# Patient Record
Sex: MALE | Race: OTHER | NOT HISPANIC OR LATINO
[De-identification: names, ages, dates, MRNs, and addresses within clinical notes are randomized per-mention and may not be internally consistent; named-entity substitution may affect disease eponyms.]

---

## 2017-06-26 ENCOUNTER — APPOINTMENT (OUTPATIENT)
Dept: ORTHOPEDIC SURGERY | Facility: CLINIC | Age: 67
End: 2017-06-26

## 2017-06-26 VITALS — HEIGHT: 71 IN | BODY MASS INDEX: 32.2 KG/M2 | WEIGHT: 230 LBS

## 2017-06-26 DIAGNOSIS — Z87.19 PERSONAL HISTORY OF OTHER DISEASES OF THE DIGESTIVE SYSTEM: ICD-10-CM

## 2017-06-26 DIAGNOSIS — G47.30 SLEEP APNEA, UNSPECIFIED: ICD-10-CM

## 2017-06-26 DIAGNOSIS — I10 ESSENTIAL (PRIMARY) HYPERTENSION: ICD-10-CM

## 2017-06-26 RX ORDER — ASPIRIN 325 MG/1
TABLET, FILM COATED ORAL
Refills: 0 | Status: ACTIVE | COMMUNITY

## 2017-06-26 RX ORDER — LISINOPRIL 30 MG/1
TABLET ORAL
Refills: 0 | Status: ACTIVE | COMMUNITY

## 2017-08-07 ENCOUNTER — APPOINTMENT (OUTPATIENT)
Dept: ORTHOPEDIC SURGERY | Facility: CLINIC | Age: 67
End: 2017-08-07
Payer: COMMERCIAL

## 2017-08-07 DIAGNOSIS — M67.911 UNSPECIFIED DISORDER OF SYNOVIUM AND TENDON, RIGHT SHOULDER: ICD-10-CM

## 2017-08-07 PROCEDURE — 99213 OFFICE O/P EST LOW 20 MIN: CPT

## 2019-12-05 ENCOUNTER — RECORD ABSTRACTING (OUTPATIENT)
Age: 69
End: 2019-12-05

## 2019-12-05 DIAGNOSIS — Z87.438 PERSONAL HISTORY OF OTHER DISEASES OF MALE GENITAL ORGANS: ICD-10-CM

## 2019-12-05 LAB
PSA FREE FLD-MCNC: 20
PSA FREE SERPL-MCNC: 0.9
PSA SERPL-MCNC: 4.7

## 2020-01-28 ENCOUNTER — APPOINTMENT (OUTPATIENT)
Dept: UROLOGY | Facility: CLINIC | Age: 70
End: 2020-01-28
Payer: MEDICARE

## 2020-01-28 VITALS
HEART RATE: 57 BPM | TEMPERATURE: 97.8 F | HEIGHT: 71 IN | SYSTOLIC BLOOD PRESSURE: 145 MMHG | WEIGHT: 230 LBS | DIASTOLIC BLOOD PRESSURE: 86 MMHG | BODY MASS INDEX: 32.2 KG/M2

## 2020-01-28 DIAGNOSIS — N52.01 ERECTILE DYSFUNCTION DUE TO ARTERIAL INSUFFICIENCY: ICD-10-CM

## 2020-01-28 DIAGNOSIS — N52.8 OTHER MALE ERECTILE DYSFUNCTION: ICD-10-CM

## 2020-01-28 DIAGNOSIS — Z00.00 ENCOUNTER FOR GENERAL ADULT MEDICAL EXAMINATION W/OUT ABNORMAL FINDINGS: ICD-10-CM

## 2020-01-28 DIAGNOSIS — R31.21 ASYMPTOMATIC MICROSCOPIC HEMATURIA: ICD-10-CM

## 2020-01-28 DIAGNOSIS — Z85.79 PERSONAL HISTORY OF OTHER MALIGNANT NEOPLASMS OF LYMPHOID, HEMATOPOIETIC AND RELATED TISSUES: ICD-10-CM

## 2020-01-28 DIAGNOSIS — R31.29 OTHER MICROSCOPIC HEMATURIA: ICD-10-CM

## 2020-01-28 DIAGNOSIS — R39.15 URGENCY OF URINATION: ICD-10-CM

## 2020-01-28 DIAGNOSIS — R68.89 OTHER GENERAL SYMPTOMS AND SIGNS: ICD-10-CM

## 2020-01-28 LAB
BILIRUB UR QL STRIP: NORMAL
CLARITY UR: CLEAR
COLLECTION METHOD: NORMAL
GLUCOSE UR-MCNC: NORMAL
HCG UR QL: 0.2 EU/DL
HGB UR QL STRIP.AUTO: NORMAL
KETONES UR-MCNC: NORMAL
LEUKOCYTE ESTERASE UR QL STRIP: NORMAL
NITRITE UR QL STRIP: NORMAL
PH UR STRIP: 5.5
PROT UR STRIP-MCNC: NORMAL
SP GR UR STRIP: 1.02

## 2020-01-28 PROCEDURE — 99204 OFFICE O/P NEW MOD 45 MIN: CPT

## 2020-01-28 PROCEDURE — 76857 US EXAM PELVIC LIMITED: CPT

## 2020-01-28 PROCEDURE — 81003 URINALYSIS AUTO W/O SCOPE: CPT | Mod: QW

## 2020-01-28 RX ORDER — ATORVASTATIN CALCIUM 10 MG/1
10 TABLET, FILM COATED ORAL
Refills: 0 | Status: ACTIVE | COMMUNITY

## 2020-01-28 NOTE — ASSESSMENT
[FreeTextEntry1] : We sent blood for PSA and 4K score and ll base next steps on these results. If the PSA or 4K is significantly higher, then repeat MRI and PNB. Otherwise reassess in 6 months.\par \par As for the ED, I discussed the use of PDE%i oral medication and we ordered sildenafil 100mg to try at  mg / dose. We reviewed the risks, alternatives, indications and chances for success.\par \par I also sent urine for C+S and cytology due to the microscopic hematuria.\par \par Misty Taylor MD\par

## 2020-01-28 NOTE — PHYSICAL EXAM
[General Appearance - Well Nourished] : well nourished [General Appearance - Well Developed] : well developed [Normal Appearance] : normal appearance [Well Groomed] : well groomed [General Appearance - In No Acute Distress] : no acute distress [Heart Rate And Rhythm] : Heart rate and rhythm were normal [] : no respiratory distress [Abdomen Soft] : soft [Costovertebral Angle Tenderness] : no ~M costovertebral angle tenderness [Abdomen Tenderness] : non-tender [Normal Station and Gait] : the gait and station were normal for the patient's age [Skin Color & Pigmentation] : normal skin color and pigmentation [No Focal Deficits] : no focal deficits [Oriented To Time, Place, And Person] : oriented to person, place, and time [Affect] : the affect was normal [Inguinal Lymph Nodes Enlarged Bilaterally] : inguinal [Not Anxious] : not anxious [Mood] : the mood was normal [Urethral Meatus] : meatus normal [Penis Abnormality] : normal circumcised penis [Urinary Bladder Findings] : the bladder was normal on palpation [Scrotum] : the scrotum was normal [Epididymis] : the epididymides were normal [Testes Mass (___cm)] : there were no testicular masses [Testes Tenderness] : no tenderness of the testes [Prostate Tenderness] : the prostate was not tender [No Prostate Nodules] : no prostate nodules [Prostate Size ___ gm] : prostate size [unfilled] gm [Prostate Size ___ (0-4)] : prostate size [unfilled] (scale: 0-4)

## 2020-01-28 NOTE — LETTER BODY
[Dear  ___] : Dear  [unfilled], [Consult Letter:] : I had the pleasure of evaluating your patient, [unfilled]. [Please see my note below.] : Please see my note below. [Consult Closing:] : Thank you very much for allowing me to participate in the care of this patient.  If you have any questions, please do not hesitate to contact me. [FreeTextEntry3] : Best Regards, \par \par Misty Taylor MD\par

## 2020-01-31 LAB
4K SCORE CALCULATION: 2 %
BACTERIA UR CULT: NORMAL
FREE PSA: 1.98 NG/ML
PERCENT FREE PSA: 23 %
PSA FREE FLD-MCNC: 22 %
PSA FREE SERPL-MCNC: 1.92 NG/ML
PSA SERPL-MCNC: 8.89 NG/ML
TOTAL PSA: 8.64 NG/ML
URINE CYTOLOGY: NORMAL

## 2020-10-06 ENCOUNTER — APPOINTMENT (OUTPATIENT)
Dept: UROLOGY | Facility: CLINIC | Age: 70
End: 2020-10-06

## 2020-10-06 ENCOUNTER — APPOINTMENT (OUTPATIENT)
Dept: UROLOGY | Facility: CLINIC | Age: 70
End: 2020-10-06
Payer: MEDICARE

## 2020-10-06 VITALS — DIASTOLIC BLOOD PRESSURE: 80 MMHG | HEART RATE: 61 BPM | TEMPERATURE: 98.2 F | SYSTOLIC BLOOD PRESSURE: 157 MMHG

## 2020-10-06 PROCEDURE — 99214 OFFICE O/P EST MOD 30 MIN: CPT

## 2020-10-06 NOTE — PHYSICAL EXAM
[General Appearance - Well Developed] : well developed [General Appearance - Well Nourished] : well nourished [Normal Appearance] : normal appearance [Well Groomed] : well groomed [General Appearance - In No Acute Distress] : no acute distress [Edema] : no peripheral edema [Respiration, Rhythm And Depth] : normal respiratory rhythm and effort [Exaggerated Use Of Accessory Muscles For Inspiration] : no accessory muscle use [Abdomen Soft] : soft [Abdomen Tenderness] : non-tender [Costovertebral Angle Tenderness] : no ~M costovertebral angle tenderness [Urethral Meatus] : meatus normal [Penis Abnormality] : normal circumcised penis [Urinary Bladder Findings] : the bladder was normal on palpation [Scrotum] : the scrotum was normal [Rectal Exam - Seminal Vesicles] : the seminal vesicles were normal [Epididymis] : the epididymides were normal [Testes Tenderness] : no tenderness of the testes [Testes Mass (___cm)] : there were no testicular masses [No Prostate Nodules] : no prostate nodules [Prostate Size ___ gm] : prostate size [unfilled] gm [Normal Station and Gait] : the gait and station were normal for the patient's age [] : no rash [No Focal Deficits] : no focal deficits [Oriented To Time, Place, And Person] : oriented to person, place, and time [Affect] : the affect was normal [Mood] : the mood was normal [Not Anxious] : not anxious [No Palpable Adenopathy] : no palpable adenopathy

## 2020-10-07 NOTE — ASSESSMENT
[FreeTextEntry1] : BPH with urinary obstruction\par -PVR today was 238 cc\par -most recent PSA in June 2020 was 7.79\par -reviewed 4K score with patient and MRI from 2018.  Discussed elevated PSA likely related to prostate volume.\par -favorable PSA density of 0.07\par -discussed initiation of Tamsulosin for elevated PVR and urinary symptoms.  Goals and side effect of medication reviewed.\par -check UCx today \par \par RTC in 1 month

## 2020-10-07 NOTE — HISTORY OF PRESENT ILLNESS
[Urinary Retention] : urinary retention [Urinary Urgency] : urinary urgency [Urinary Frequency] : urinary frequency [Nocturia] : nocturia [Straining] : straining [Weak Stream] : weak stream [FreeTextEntry1] : This is a 70 year old gentleman with PMHx significant for HTN and high cholesterol.  Has been followed in the past by Dr. Kelly for elevated PSA.\par \par Prostate MRI in 2018, PIRADS 3.  patient underwent biopsy 11/2018 which was negative.\par Prostate volume of 112 cc noted on MRI.\par PSA range from 7-9 over the past years\par 4K score in Jan 2020 was 2%\par \par Currently reports weakening stream over the past year, occasionally needs to push to urinate, slight sense of incomplete bladder emptying.\par Nocturia x 2\par \par No family history of any urological cancers\par  [Urinary Incontinence] : no urinary incontinence [Intermittency] : no intermittency [Post-Void Dribbling] : no post-void dribbling [Dysuria] : no dysuria [Hematuria - Gross] : no gross hematuria

## 2020-10-08 LAB — BACTERIA UR CULT: NORMAL

## 2020-11-03 ENCOUNTER — APPOINTMENT (OUTPATIENT)
Dept: UROLOGY | Facility: CLINIC | Age: 70
End: 2020-11-03
Payer: MEDICARE

## 2020-11-03 VITALS — TEMPERATURE: 98.3 F | DIASTOLIC BLOOD PRESSURE: 74 MMHG | HEART RATE: 66 BPM | SYSTOLIC BLOOD PRESSURE: 133 MMHG

## 2020-11-03 DIAGNOSIS — N40.1 BENIGN PROSTATIC HYPERPLASIA WITH LOWER URINARY TRACT SYMPMS: ICD-10-CM

## 2020-11-03 DIAGNOSIS — N13.8 BENIGN PROSTATIC HYPERPLASIA WITH LOWER URINARY TRACT SYMPMS: ICD-10-CM

## 2020-11-03 PROCEDURE — 99213 OFFICE O/P EST LOW 20 MIN: CPT

## 2020-11-03 NOTE — ASSESSMENT
[FreeTextEntry1] : BPH\par on tamsulosin 0.4\par he will consider increasing to tamsuolisn 0.8 - he will call if he chooses to increase\par f/u 6months\par hx elevated PSA\par will monitor\par

## 2020-11-03 NOTE — HISTORY OF PRESENT ILLNESS
[FreeTextEntry1] : now on tamsulosin 0.4\par improvement in nocutira\par feels better with medication

## 2021-05-11 ENCOUNTER — APPOINTMENT (OUTPATIENT)
Dept: UROLOGY | Facility: CLINIC | Age: 71
End: 2021-05-11
Payer: MEDICARE

## 2021-05-11 VITALS
TEMPERATURE: 97.9 F | SYSTOLIC BLOOD PRESSURE: 134 MMHG | WEIGHT: 230 LBS | HEIGHT: 71 IN | BODY MASS INDEX: 32.2 KG/M2 | HEART RATE: 68 BPM | DIASTOLIC BLOOD PRESSURE: 78 MMHG

## 2021-05-11 PROCEDURE — 99213 OFFICE O/P EST LOW 20 MIN: CPT

## 2021-05-11 RX ORDER — SILDENAFIL 100 MG/1
100 TABLET, FILM COATED ORAL
Qty: 30 | Refills: 1 | Status: ACTIVE | COMMUNITY
Start: 2020-01-28 | End: 1900-01-01

## 2021-05-11 NOTE — HISTORY OF PRESENT ILLNESS
[FreeTextEntry1] : this is a 70 year old man with BPH \par On  tamsulosin 0.8 mg \par Patient reports improved symptoms\par feels better with medication \par \par PSA  8.89  Jan 2020\par

## 2021-05-11 NOTE — ASSESSMENT
[FreeTextEntry1] : BPH with elevated PSA\par on tamsulosin 0.8 mg \par Reinforce teaching on medication use and s/e\par will monitor\par \par Repeat PSA - results over the phone \par Prostate MRI in 2018, PIRADS 3. patient underwent biopsy 11/2018 which was negative.\par Prostate volume of 112 cc noted on MRI.\par PSA range from 7-9 over the past years\par 4K score in Jan 2020 was 2%\par \par follow in 6 months

## 2021-05-12 LAB
PSA FREE FLD-MCNC: 26 %
PSA FREE SERPL-MCNC: 2 NG/ML
PSA SERPL-MCNC: 7.62 NG/ML

## 2021-06-30 ENCOUNTER — NON-APPOINTMENT (OUTPATIENT)
Age: 71
End: 2021-06-30

## 2021-11-11 ENCOUNTER — LABORATORY RESULT (OUTPATIENT)
Age: 71
End: 2021-11-11

## 2021-11-11 ENCOUNTER — APPOINTMENT (OUTPATIENT)
Dept: UROLOGY | Facility: CLINIC | Age: 71
End: 2021-11-11
Payer: MEDICARE

## 2021-11-11 VITALS — TEMPERATURE: 98.1 F | SYSTOLIC BLOOD PRESSURE: 149 MMHG | HEART RATE: 72 BPM | DIASTOLIC BLOOD PRESSURE: 79 MMHG

## 2021-11-11 PROCEDURE — 99214 OFFICE O/P EST MOD 30 MIN: CPT

## 2021-11-11 NOTE — PHYSICAL EXAM

## 2021-11-11 NOTE — ASSESSMENT
[FreeTextEntry1] : BPH with elevated PSA\par Continue  tamsulosin 0.8 mg \par Reinforce teaching on medication use and s/e\par No interest  in procedural at this time. \par Advise to reduce caffeine and fluid intake 2 hrs before bedtime\par Comfortable with mild symptoms. \par \par Repeat PSA - results over the phone \par Prostate MRI in 2018, PIRADS 3. biopsy 11/2018, negative.\par Prostate volume of 112 cc noted on MRI.\par PSA 7.62, free PSA 26% May 11 2021\par PSA range from 7-9 over the past years\par 4K score in Jan 2020 was 2%\par \par labs: \par UA, UC\par PSA\par \par \par follow in 6 months. \par

## 2021-11-11 NOTE — HISTORY OF PRESENT ILLNESS
[Currently Experiencing ___] :  [unfilled] [Urinary Frequency] : urinary frequency [Nocturia] : nocturia [Weak Stream] : weak stream [Erectile Dysfunction] : Erectile Dysfunction [None] : None [FreeTextEntry1] : 71M with hx HTN, HDL, ED, and BPH\par returns for follow u\par PSA 7.62, free PSA 26% May 11 2021\par Prostate MRI in 2018, PIRADS 3t biopsy 11/2018 which was negative.\par Prostate volume of 112 cc noted on MRI.\par On Tamsulosin 0.4 mg - did not take 0.8 \par mildly improve symptoms\par improve FOS\par no bothersome symptoms\par Denies dysuria, hematuria, flank pain \par

## 2021-11-12 ENCOUNTER — NON-APPOINTMENT (OUTPATIENT)
Age: 71
End: 2021-11-12

## 2022-01-25 ENCOUNTER — APPOINTMENT (OUTPATIENT)
Dept: ORTHOPEDIC SURGERY | Facility: AMBULATORY SURGERY CENTER | Age: 72
End: 2022-01-25
Payer: MEDICARE

## 2022-01-25 VITALS — HEIGHT: 71 IN | WEIGHT: 230 LBS | BODY MASS INDEX: 32.2 KG/M2

## 2022-01-25 DIAGNOSIS — M25.511 PAIN IN RIGHT SHOULDER: ICD-10-CM

## 2022-01-25 DIAGNOSIS — R29.898 OTHER SYMPTOMS AND SIGNS INVOLVING THE MUSCULOSKELETAL SYSTEM: ICD-10-CM

## 2022-01-25 DIAGNOSIS — M75.21 BICIPITAL TENDINITIS, RIGHT SHOULDER: ICD-10-CM

## 2022-01-25 PROCEDURE — 99203 OFFICE O/P NEW LOW 30 MIN: CPT

## 2022-01-25 PROCEDURE — 73564 X-RAY EXAM KNEE 4 OR MORE: CPT | Mod: RT

## 2022-01-25 RX ORDER — FAMOTIDINE 40 MG/1
40 TABLET, FILM COATED ORAL
Refills: 0 | Status: COMPLETED | COMMUNITY
End: 2022-01-25

## 2022-01-25 RX ORDER — FAMOTIDINE 40 MG/1
40 TABLET, FILM COATED ORAL
Refills: 0 | Status: ACTIVE | COMMUNITY

## 2022-01-25 NOTE — HISTORY OF PRESENT ILLNESS
[de-identified] : 71-year-old gentleman who had seen 4 years ago for his shoulder which has been fine, comes in because he fell injuring his right knee 1 week ago. He is having pain, swelling and stiffness.\par He has pain standing but usually less pain as he is walking.  Pain is mostly in the anteromedial knee and into the shin area.  Without any assistive devices.  He did take some Tylenol and put ice on it.\par No left knee pain.\par His knee does not hurt normally except if he sitting a long time like when he is at the theater and the knee is flexed he might get some aching or discomfort in the knee and feels better to straighten it out when he sitting.\par His knee hurts more with stairs as well now.

## 2022-01-25 NOTE — ASSESSMENT
[FreeTextEntry1] : 71-year-old on his right knee 1 week ago with a contusion.  There is some bruising.  There could be bone bruising as well.  No evidence of fracture.  Given that he is walking without any significant pain it would be very unlikely there is any tibial plateau fracture but again could be some bone bruising.  If he starts to have increased pain at all walking I would want him to get an MRI and he was given a prescription.\par He can move the knee as is comfortable.  He should limit weightbearing activities for the next couple weeks until its feeling better.  Elevate and he can alternate warm soaks and ice for the bruising and swelling.  Tylenol if needed but not to mask pain so he can walk more.\par There is mild patellofemoral arthritis which is why he probably gets pain when sitting long periods of time.  He should move his knee a little more.  I will have him do more strengthening as his knee gets better for this and we will discuss more treatment for arthritis which is mild at this point fortunately.\par Follow-up in 2 to 3 weeks to check and make sure it is getting better

## 2022-01-25 NOTE — PHYSICAL EXAM
[LE] : Sensory: Intact in bilateral lower extremities [Normal RLE] : Right Lower Extremity: No scars, rashes, lesions, ulcers, skin intact [Normal LLE] : Left Lower Extremity: No scars, rashes, lesions, ulcers, skin intact [Normal Touch] : sensation intact for touch [Obese] : obese [Normal] : Oriented to person, place, and time, insight and judgement were intact and the affect was normal [de-identified] : Right knee with left for comparison\par Skin is intact.\par There is mild edema and small amount of fluid in the prepatellar bursa and faint ecchymoses anterior knee extending along the upper shin.\par No erythema.\par Possible trace effusion.\par Minimally tender on the dorsal patella.  Mildly tender patella facets.  Tender greatest anterior medial tibial plateau.  Nontender lateral joint line.  Nontender on the femur.\par Normal varus and valgus laxity.\par Knee range of motion is 0-1 125 to 130 degrees flexion with pain on increasing flexion anterior knee.  No crepitus\par Negative anterior posterior drawer and Ia Lachman.\par Motor and sensation are intact distally.\par Gait is not antalgic and he denies having any pain when walking. [de-identified] : \par X-rays of the right knee weightbearing 4 views taken today showed sclerosis medial tibial plateau.  No visible fractures or depression of the tibial plateau.\par Small osteophytes at the patella consistent with mild patellofemoral arthritis.  Medial and lateral joint spaces are well-maintained.

## 2022-01-26 ENCOUNTER — APPOINTMENT (OUTPATIENT)
Dept: ORTHOPEDIC SURGERY | Facility: CLINIC | Age: 72
End: 2022-01-26

## 2022-02-09 ENCOUNTER — APPOINTMENT (OUTPATIENT)
Dept: ORTHOPEDIC SURGERY | Facility: CLINIC | Age: 72
End: 2022-02-09
Payer: MEDICARE

## 2022-02-09 DIAGNOSIS — M17.11 UNILATERAL PRIMARY OSTEOARTHRITIS, RIGHT KNEE: ICD-10-CM

## 2022-02-09 DIAGNOSIS — S80.01XA CONTUSION OF RIGHT KNEE, INITIAL ENCOUNTER: ICD-10-CM

## 2022-02-09 PROCEDURE — 99213 OFFICE O/P EST LOW 20 MIN: CPT

## 2022-02-09 NOTE — HISTORY OF PRESENT ILLNESS
[1] : a current pain level of 1/10 [de-identified] : 70 y/o male being seen for a f/u visit for his right knee after falling 4 weeks ago. He is feeling better in both his knee and tibia. He still has some swelling over his tibia but currently no pain with walking and stairs. He is point tender over patella and has pain with kneeling on that knee. He did not go for MRI bc he was seeing improvement. He has not been icing or taking any pain medications.

## 2022-02-09 NOTE — ASSESSMENT
[FreeTextEntry1] : 70 y/o with right knee anterior contusion doing mostly better after about 3 weeks.  He likely had some bone bruising.  He has been walking a lot and going up and down 4 flights of stairs without pain which is good.  He should give it a few more weeks to let it fully heal.  He will do home exercises and is in physical therapy doing strengthening.  Ice as needed.  Follow-up if he has any ongoing pain or concerns.

## 2022-02-09 NOTE — PHYSICAL EXAM
[Normal RLE] : Right Lower Extremity: No scars, rashes, lesions, ulcers, skin intact [Normal LLE] : Left Lower Extremity: No scars, rashes, lesions, ulcers, skin intact [Normal Touch] : sensation intact for touch [LE] : Sensory: Intact in bilateral lower extremities [Normal] : Gait: normal [Obese] : obese [de-identified] : Right knee with left for comparison\par Skin is intact.\par No edema, ecchymoses, erythema.\par No effusion.\par Normal gait\par Focal tenderness on the dorsal inferior half patella.  Mildly tender patella facets.  Minimally tender medial tibia nontender lateral joint line.  Femoral condyles and tibia\par Normal varus and valgus laxity.\par Knee range of motion is 0-130 degrees flexion without pain.  No crepitus\par Negative anterior posterior drawer and Ia Lachman.\par Motor and sensation are intact distally.\par Ia Lachman.  Normal varus and valgus laxity.  Negative anterior and posterior drawer.  Negative Marcus\par Gait is not antalgic and he denies having any pain when walking. [de-identified] : \par X-rays of the right knee weightbearing 3 views taken today compared to last visit showed no visible fractures or depression of the tibial plateau.\par Small osteophytes at the patella consistent with mild patellofemoral arthritis.  Medial and lateral joint spaces are well-maintained.

## 2022-05-10 ENCOUNTER — APPOINTMENT (OUTPATIENT)
Dept: UROLOGY | Facility: CLINIC | Age: 72
End: 2022-05-10
Payer: MEDICARE

## 2022-05-10 VITALS — DIASTOLIC BLOOD PRESSURE: 73 MMHG | HEART RATE: 77 BPM | SYSTOLIC BLOOD PRESSURE: 134 MMHG | TEMPERATURE: 97.6 F

## 2022-05-10 PROCEDURE — 99214 OFFICE O/P EST MOD 30 MIN: CPT

## 2022-05-10 NOTE — ASSESSMENT
[FreeTextEntry1] : BPH\par on flomax 0.8\par add finasteride\par considering outlet procedure\par \par elevated PSA\par for 4k \par f/u 6 months

## 2022-05-12 LAB
APPEARANCE: CLEAR
BACTERIA UR CULT: NORMAL
BACTERIA: NEGATIVE
BILIRUBIN URINE: NEGATIVE
BLOOD URINE: NEGATIVE
COLOR: YELLOW
GLUCOSE QUALITATIVE U: NEGATIVE
HYALINE CASTS: 0 /LPF
KETONES URINE: NEGATIVE
LEUKOCYTE ESTERASE URINE: NEGATIVE
MICROSCOPIC-UA: NORMAL
NITRITE URINE: NEGATIVE
PH URINE: 6
PROTEIN URINE: NORMAL
RED BLOOD CELLS URINE: 4 /HPF
SPECIFIC GRAVITY URINE: 1.02
SQUAMOUS EPITHELIAL CELLS: 0 /HPF
UROBILINOGEN URINE: NORMAL
WHITE BLOOD CELLS URINE: 1 /HPF

## 2022-05-18 LAB
4K SCORE CALCULATION: 3 %
FREE PSA: 1.95 NG/ML
PERCENT FREE PSA: 26 %
TOTAL PSA: 7.55 NG/ML

## 2022-06-20 ENCOUNTER — NON-APPOINTMENT (OUTPATIENT)
Age: 72
End: 2022-06-20

## 2022-11-08 ENCOUNTER — APPOINTMENT (OUTPATIENT)
Dept: UROLOGY | Facility: CLINIC | Age: 72
End: 2022-11-08

## 2022-11-08 VITALS
TEMPERATURE: 98.1 F | BODY MASS INDEX: 32.2 KG/M2 | SYSTOLIC BLOOD PRESSURE: 140 MMHG | HEIGHT: 71 IN | DIASTOLIC BLOOD PRESSURE: 75 MMHG | WEIGHT: 230 LBS

## 2022-11-08 PROCEDURE — 99214 OFFICE O/P EST MOD 30 MIN: CPT

## 2022-11-09 LAB
APPEARANCE: CLEAR
BACTERIA: NEGATIVE
BILIRUBIN URINE: NEGATIVE
BLOOD URINE: NEGATIVE
COLOR: YELLOW
GLUCOSE QUALITATIVE U: NEGATIVE
HYALINE CASTS: 2 /LPF
KETONES URINE: NEGATIVE
LEUKOCYTE ESTERASE URINE: NEGATIVE
MICROSCOPIC-UA: NORMAL
NITRITE URINE: NEGATIVE
PH URINE: 5.5
PROTEIN URINE: NORMAL
PSA FREE FLD-MCNC: 33 %
PSA FREE SERPL-MCNC: 0.82 NG/ML
PSA SERPL-MCNC: 2.47 NG/ML
RED BLOOD CELLS URINE: 2 /HPF
SPECIFIC GRAVITY URINE: 1.03
SQUAMOUS EPITHELIAL CELLS: 1 /HPF
UROBILINOGEN URINE: NORMAL
WHITE BLOOD CELLS URINE: 1 /HPF

## 2022-11-09 NOTE — HISTORY OF PRESENT ILLNESS
[Currently Experiencing ___] :  [unfilled] [Urinary Urgency] : urinary urgency [Urinary Frequency] : urinary frequency [Nocturia] : nocturia [None] : None [FreeTextEntry1] : 82 yr old male with h/o HTN, DANIELA, elevated PSA, BPH with nocturia\par Returns for follow up \par Complains of mildly improved nocturia\par 4K score 3%, PSA 7.55, free PSA 26% - on Finasteride \par Prostate MRI in 2018, PIRADS 3. biopsy 11/2018, negative.\par Prostate volume of 112 cc noted on MRI.\par Currently Tamsulosin 0.8 mg and Finasteride 5 mg\par Denies dysuria, hematuria , flank pain

## 2022-11-09 NOTE — ASSESSMENT
[FreeTextEntry1] : BPH with nocturia\par Reviewed treatment options\par no interest in starting medication for now\par Continue Tamsulosin 0.8 mg and finasteride 5 mg - Refilled \par \par Elevated PSA \par 4K score 3%, PSA 7.55, free PSA 26% - on Finasteride -MAY 2022\par Labs: UA, UC, PSA \par \par follow up in 6 months

## 2022-11-11 ENCOUNTER — NON-APPOINTMENT (OUTPATIENT)
Age: 72
End: 2022-11-11

## 2022-11-11 LAB — BACTERIA UR CULT: NORMAL

## 2022-11-14 ENCOUNTER — NON-APPOINTMENT (OUTPATIENT)
Age: 72
End: 2022-11-14

## 2023-05-08 ENCOUNTER — NON-APPOINTMENT (OUTPATIENT)
Age: 73
End: 2023-05-08

## 2023-05-09 ENCOUNTER — APPOINTMENT (OUTPATIENT)
Dept: UROLOGY | Facility: CLINIC | Age: 73
End: 2023-05-09
Payer: MEDICARE

## 2023-05-09 VITALS — HEART RATE: 60 BPM | SYSTOLIC BLOOD PRESSURE: 138 MMHG | DIASTOLIC BLOOD PRESSURE: 77 MMHG | TEMPERATURE: 97.8 F

## 2023-05-09 PROCEDURE — 99214 OFFICE O/P EST MOD 30 MIN: CPT

## 2023-05-09 RX ORDER — FINASTERIDE 5 MG/1
5 TABLET, FILM COATED ORAL DAILY
Qty: 90 | Refills: 3 | Status: ACTIVE | COMMUNITY
Start: 2022-05-10 | End: 1900-01-01

## 2023-05-09 NOTE — ASSESSMENT
[FreeTextEntry1] : BPH\par Continue tamsulosin 0.8 + finasteride 5 mg\par UA UCx\par no interest in new/additonal meds\par \par Elevated PSA\par flucatuating\par prev neg biopsy\par PSA today\par \par f/u results by phone. \par f/u 1year

## 2023-05-09 NOTE — HISTORY OF PRESENT ILLNESS
[FreeTextEntry1] : 72 yr old male with h/o HTN, DANIELA, elevated PSA, BPH with nocturia\par Returns for follow up \par \par On flomax 0.8 + finasteride 5 mg\par Good FOS\par Improved nocturia\par Improved urgency and frequency.\par Denies dysuria, denies hematuria. No flank pain\par \par Elevated PSA\par Last PSA 11/2022 2.47\par Previous PSA 05/2022 PSA 7.55, free PSA 26% - on Finasteride \par Previous PSA 11/2021 7.16\par Prostate MRI in 2018, PIRADS 3. biopsy 11/2018, negative.\par Prostate volume of 112 cc noted on MRI.\par \par No other complaint.

## 2023-05-10 LAB
APPEARANCE: ABNORMAL
BACTERIA: NEGATIVE /HPF
BILIRUBIN URINE: NEGATIVE
BLOOD URINE: NEGATIVE
CAST: 0 /LPF
COLOR: YELLOW
EPITHELIAL CELLS: 1 /HPF
GLUCOSE QUALITATIVE U: NEGATIVE MG/DL
KETONES URINE: ABNORMAL MG/DL
LEUKOCYTE ESTERASE URINE: NEGATIVE
MICROSCOPIC-UA: NORMAL
NITRITE URINE: NEGATIVE
PH URINE: 5.5
PROTEIN URINE: NEGATIVE MG/DL
PSA SERPL-MCNC: 2.57 NG/ML
RED BLOOD CELLS URINE: 2 /HPF
SPECIFIC GRAVITY URINE: 1.02
UROBILINOGEN URINE: 0.2 MG/DL
WHITE BLOOD CELLS URINE: 0 /HPF

## 2023-05-11 LAB — BACTERIA UR CULT: NORMAL

## 2024-06-04 ENCOUNTER — APPOINTMENT (OUTPATIENT)
Dept: UROLOGY | Facility: CLINIC | Age: 74
End: 2024-06-04
Payer: MEDICARE

## 2024-06-04 VITALS
TEMPERATURE: 98.4 F | HEIGHT: 71 IN | HEART RATE: 72 BPM | SYSTOLIC BLOOD PRESSURE: 150 MMHG | BODY MASS INDEX: 32.2 KG/M2 | WEIGHT: 230 LBS | DIASTOLIC BLOOD PRESSURE: 73 MMHG

## 2024-06-04 DIAGNOSIS — N40.0 BENIGN PROSTATIC HYPERPLASIA WITHOUT LOWER URINARY TRACT SYMPMS: ICD-10-CM

## 2024-06-04 DIAGNOSIS — R97.20 BENIGN PROSTATIC HYPERPLASIA WITHOUT LOWER URINARY TRACT SYMPMS: ICD-10-CM

## 2024-06-04 PROCEDURE — 99214 OFFICE O/P EST MOD 30 MIN: CPT

## 2024-06-04 PROCEDURE — G2211 COMPLEX E/M VISIT ADD ON: CPT

## 2024-06-05 ENCOUNTER — NON-APPOINTMENT (OUTPATIENT)
Age: 74
End: 2024-06-05

## 2024-06-05 LAB
APPEARANCE: CLEAR
BACTERIA: NEGATIVE /HPF
BILIRUBIN URINE: NEGATIVE
BLOOD URINE: ABNORMAL
CAST: 2 /LPF
COLOR: YELLOW
EPITHELIAL CELLS: 3 /HPF
GLUCOSE QUALITATIVE U: NEGATIVE MG/DL
KETONES URINE: NEGATIVE MG/DL
LEUKOCYTE ESTERASE URINE: NEGATIVE
MICROSCOPIC-UA: NORMAL
NITRITE URINE: NEGATIVE
PH URINE: 5.5
PROTEIN URINE: NORMAL MG/DL
RED BLOOD CELLS URINE: 42 /HPF
SPECIFIC GRAVITY URINE: 1.03
UROBILINOGEN URINE: 0.2 MG/DL
WHITE BLOOD CELLS URINE: 1 /HPF

## 2024-06-05 NOTE — HISTORY OF PRESENT ILLNESS
[FreeTextEntry1] : 73M follow-up BPH/LUTS on Flomax 0.8 mg qHS previously on Finasteride, but stopped ~5 months reports symptoms at baseline 2-3x nocturia no dysuria/hematuria reports erections are good  Labs/Imaging: PSA 2.2 - 10/19/23 (on finasteride) 2.57 - 5/10/23 (on finasteride) 2.47 - 11/9/22 (on finasteride) 05/2022 PSA 7.55, free PSA 26% - on Finasteride 11/2021 PSA 7.16 Prostate MRI in 2018, PIRADS 3. biopsy 11/2018, negative Prostate volume of 112 cc noted on MRI

## 2024-06-05 NOTE — ASSESSMENT
[FreeTextEntry1] : A/P: 73M w/ BPH/LUTS - c/w Flomax - urinary symptoms at baseline - behavioral modifications - UA/UCx - PSA today - FU x1 year

## 2024-06-06 DIAGNOSIS — R97.20 ELEVATED PROSTATE, SPECIFIC ANTIGEN [PSA]: ICD-10-CM

## 2024-06-06 LAB
BACTERIA UR CULT: NORMAL
PSA FREE FLD-MCNC: 27 %
PSA FREE SERPL-MCNC: 1.73 NG/ML
PSA SERPL-MCNC: 6.51 NG/ML

## 2024-06-06 RX ORDER — TAMSULOSIN HYDROCHLORIDE 0.4 MG/1
0.4 CAPSULE ORAL
Qty: 180 | Refills: 3 | Status: ACTIVE | COMMUNITY
Start: 2020-10-06 | End: 1900-01-01

## 2024-06-20 ENCOUNTER — APPOINTMENT (OUTPATIENT)
Dept: UROLOGY | Facility: CLINIC | Age: 74
End: 2024-06-20
Payer: MEDICARE

## 2024-06-20 ENCOUNTER — NON-APPOINTMENT (OUTPATIENT)
Age: 74
End: 2024-06-20

## 2024-06-20 VITALS
TEMPERATURE: 98.3 F | DIASTOLIC BLOOD PRESSURE: 75 MMHG | BODY MASS INDEX: 32.2 KG/M2 | WEIGHT: 230 LBS | SYSTOLIC BLOOD PRESSURE: 134 MMHG | HEIGHT: 71 IN | HEART RATE: 60 BPM

## 2024-06-20 DIAGNOSIS — D49.4 NEOPLASM OF UNSPECIFIED BEHAVIOR OF BLADDER: ICD-10-CM

## 2024-06-20 PROCEDURE — 52000 CYSTOURETHROSCOPY: CPT

## 2024-06-20 PROCEDURE — 99214 OFFICE O/P EST MOD 30 MIN: CPT | Mod: 25,57

## 2024-06-20 NOTE — HISTORY OF PRESENT ILLNESS
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

## 2024-06-20 NOTE — ASSESSMENT
[FreeTextEntry1] : bladder tumor for ct urogram We reviewed the cystoscopy results. The patient understands that the finding on cystoscopy are abnormal and may represent a bladder cancer. Diagnosis and treatment of this consist of transurethral resection of the bladder tumor as the first step. The patient understands that this is often an outpatient procedure. Risks of TURBT including urinary tract infection, gross hematuria, the potential need for a brumfield catheter for a prolonged period postop, urinary retention, deep vein thrombosis/pulmonary embolism and risk of bladder perforation rarely requiring open exploration and repair were discussed. The potential for repeat resection in the intermediate postop period was reviewed. The natural history of bladder cancer including risks of recurrence, role of intravesical therapy and potential need for extensive treatment were reviewed. The patient opts to proceed.

## 2024-06-21 ENCOUNTER — APPOINTMENT (OUTPATIENT)
Dept: CT IMAGING | Facility: HOSPITAL | Age: 74
End: 2024-06-21

## 2024-06-21 ENCOUNTER — OUTPATIENT (OUTPATIENT)
Dept: OUTPATIENT SERVICES | Facility: HOSPITAL | Age: 74
LOS: 1 days | End: 2024-06-21
Payer: MEDICARE

## 2024-06-21 ENCOUNTER — RESULT REVIEW (OUTPATIENT)
Age: 74
End: 2024-06-21

## 2024-06-21 LAB — POCT ISTAT CREATININE: 1 MG/DL — SIGNIFICANT CHANGE UP (ref 0.5–1.3)

## 2024-06-21 PROCEDURE — 71046 X-RAY EXAM CHEST 2 VIEWS: CPT | Mod: 26

## 2024-06-21 PROCEDURE — 74177 CT ABD & PELVIS W/CONTRAST: CPT | Mod: 26,MH

## 2024-06-21 PROCEDURE — 74177 CT ABD & PELVIS W/CONTRAST: CPT | Mod: MH

## 2024-06-21 PROCEDURE — 71046 X-RAY EXAM CHEST 2 VIEWS: CPT

## 2024-06-21 PROCEDURE — 82565 ASSAY OF CREATININE: CPT

## 2024-06-24 LAB
APPEARANCE: CLEAR
BACTERIA UR CULT: NORMAL
BACTERIA: NEGATIVE /HPF
BILIRUBIN URINE: NEGATIVE
BLOOD URINE: ABNORMAL
CAST: 0 /LPF
COLOR: YELLOW
EPITHELIAL CELLS: 1 /HPF
GLUCOSE QUALITATIVE U: NEGATIVE MG/DL
KETONES URINE: NEGATIVE MG/DL
LEUKOCYTE ESTERASE URINE: NEGATIVE
MICROSCOPIC-UA: NORMAL
NITRITE URINE: NEGATIVE
PH URINE: 6
PROTEIN URINE: NEGATIVE MG/DL
RED BLOOD CELLS URINE: 4 /HPF
SPECIFIC GRAVITY URINE: 1.02
UROBILINOGEN URINE: 1 MG/DL
WHITE BLOOD CELLS URINE: 1 /HPF

## 2024-07-08 DIAGNOSIS — N39.0 URINARY TRACT INFECTION, SITE NOT SPECIFIED: ICD-10-CM

## 2024-07-08 RX ORDER — AMOXICILLIN AND CLAVULANATE POTASSIUM 875; 125 MG/1; MG/1
875-125 TABLET, COATED ORAL
Qty: 14 | Refills: 0 | Status: ACTIVE | COMMUNITY
Start: 2024-07-08 | End: 1900-01-01

## 2024-07-12 NOTE — ASU PATIENT PROFILE, ADULT - NSICDXPASTMEDICALHX_GEN_ALL_CORE_FT
PAST MEDICAL HISTORY:  CAD (coronary artery disease)     Fatty liver     HLD (hyperlipidemia)     HTN (hypertension)     DANIELA on CPAP

## 2024-07-12 NOTE — ASU PATIENT PROFILE, ADULT - NSICDXPASTSURGICALHX_GEN_ALL_CORE_FT
PAST SURGICAL HISTORY:  History of ankle surgery right    History of melanoma excision right thigh    S/P cholecystectomy

## 2024-07-14 ENCOUNTER — TRANSCRIPTION ENCOUNTER (OUTPATIENT)
Age: 74
End: 2024-07-14

## 2024-07-15 ENCOUNTER — APPOINTMENT (OUTPATIENT)
Dept: UROLOGY | Facility: AMBULATORY SURGERY CENTER | Age: 74
End: 2024-07-15

## 2024-07-15 ENCOUNTER — RESULT REVIEW (OUTPATIENT)
Age: 74
End: 2024-07-15

## 2024-07-15 ENCOUNTER — OUTPATIENT (OUTPATIENT)
Dept: OUTPATIENT SERVICES | Facility: HOSPITAL | Age: 74
LOS: 1 days | Discharge: HOME | End: 2024-07-15
Payer: MEDICARE

## 2024-07-15 ENCOUNTER — TRANSCRIPTION ENCOUNTER (OUTPATIENT)
Age: 74
End: 2024-07-15

## 2024-07-15 VITALS
WEIGHT: 245.59 LBS | HEIGHT: 71 IN | RESPIRATION RATE: 16 BRPM | SYSTOLIC BLOOD PRESSURE: 123 MMHG | OXYGEN SATURATION: 96 % | DIASTOLIC BLOOD PRESSURE: 72 MMHG | TEMPERATURE: 99 F | HEART RATE: 56 BPM

## 2024-07-15 VITALS
SYSTOLIC BLOOD PRESSURE: 125 MMHG | HEART RATE: 56 BPM | TEMPERATURE: 97 F | DIASTOLIC BLOOD PRESSURE: 73 MMHG | OXYGEN SATURATION: 98 % | RESPIRATION RATE: 12 BRPM

## 2024-07-15 DIAGNOSIS — Z98.890 OTHER SPECIFIED POSTPROCEDURAL STATES: Chronic | ICD-10-CM

## 2024-07-15 DIAGNOSIS — Z90.49 ACQUIRED ABSENCE OF OTHER SPECIFIED PARTS OF DIGESTIVE TRACT: Chronic | ICD-10-CM

## 2024-07-15 PROCEDURE — 88305 TISSUE EXAM BY PATHOLOGIST: CPT | Mod: 26

## 2024-07-15 PROCEDURE — 52235 CYSTOSCOPY AND TREATMENT: CPT

## 2024-07-15 RX ORDER — FENTANYL CITRATE 50 UG/ML
25 INJECTION, SOLUTION INTRAMUSCULAR; INTRAVENOUS
Refills: 0 | Status: DISCONTINUED | OUTPATIENT
Start: 2024-07-15 | End: 2024-07-15

## 2024-07-15 RX ORDER — ASPIRIN 325 MG/1
1 TABLET, FILM COATED ORAL
Refills: 0 | DISCHARGE

## 2024-07-15 RX ORDER — LISINOPRIL AND HYDROCHLOROTHIAZIDE 25; 20 MG/1; MG/1
1 TABLET ORAL
Refills: 0 | DISCHARGE

## 2024-07-15 RX ORDER — FAMOTIDINE 40 MG
1 TABLET ORAL
Refills: 0 | DISCHARGE

## 2024-07-15 RX ORDER — TAMSULOSIN HYDROCHLORIDE 0.4 MG/1
2 CAPSULE ORAL
Refills: 0 | DISCHARGE

## 2024-07-15 RX ORDER — APREPITANT 125MG-80MG
40 KIT ORAL ONCE
Refills: 0 | Status: COMPLETED | OUTPATIENT
Start: 2024-07-15 | End: 2024-07-15

## 2024-07-15 RX ORDER — AMLODIPINE BESYLATE 2.5 MG/1
1 TABLET ORAL
Refills: 0 | DISCHARGE

## 2024-07-15 RX ORDER — ACETAMINOPHEN 325 MG
1000 TABLET ORAL ONCE
Refills: 0 | Status: COMPLETED | OUTPATIENT
Start: 2024-07-15 | End: 2024-07-15

## 2024-07-15 RX ORDER — ICOSAPENT ETHYL 0.5 G/1
2 CAPSULE, LIQUID FILLED ORAL
Refills: 0 | DISCHARGE

## 2024-07-15 RX ORDER — ATORVASTATIN CALCIUM 20 MG/1
1 TABLET, FILM COATED ORAL
Refills: 0 | DISCHARGE

## 2024-07-15 RX ORDER — DEXTROSE MONOHYDRATE AND SODIUM CHLORIDE 5; .3 G/100ML; G/100ML
500 INJECTION, SOLUTION INTRAVENOUS
Refills: 0 | Status: DISCONTINUED | OUTPATIENT
Start: 2024-07-15 | End: 2024-07-15

## 2024-07-15 RX ORDER — PHENAZOPYRIDINE HCL 200 MG/1
200 TABLET ORAL ONCE
Refills: 0 | Status: DISCONTINUED | OUTPATIENT
Start: 2024-07-15 | End: 2024-07-15

## 2024-07-15 RX ORDER — METFORMIN HYDROCHLORIDE 850 MG/1
1 TABLET, FILM COATED ORAL
Refills: 0 | DISCHARGE

## 2024-07-15 RX ADMIN — DEXTROSE MONOHYDRATE AND SODIUM CHLORIDE 100 MILLILITER(S): 5; .3 INJECTION, SOLUTION INTRAVENOUS at 09:06

## 2024-07-15 RX ADMIN — Medication 1000 MILLIGRAM(S): at 06:20

## 2024-07-15 RX ADMIN — APREPITANT 40 MILLIGRAM(S): KIT at 06:19

## 2024-07-15 NOTE — ASU DISCHARGE PLAN (ADULT/PEDIATRIC) - CARE PROVIDER_API CALL
Skylar Campbell.  Urology  [address]  [place] [address]  Phone: [phone]  Fax: [phone]  Follow Up Time:

## 2024-07-15 NOTE — ASU DISCHARGE PLAN (ADULT/PEDIATRIC) - ASU DC SPECIAL INSTRUCTIONSFT
TRANSURETHRAL RESECTION OF BLADDER TUMOR    GENERAL: It is common to have blood in your urine after your procedure.  It may be pink or even red; and it is important to increase fluid intake to 2-3L of water per day to keep the urine as clear as possible. Please inform your doctor if you have a significant amount of clot in the urine or if you are unable to void at all.  The urine may clear and then become bloody again especially as you are more physically active. It is not uncommon to have some burning when you urinate, this will gradually improve. If a catheter is in place, it is not uncommon to have occasional leakage or urine or blood around the catheter. Please call your urologist if this is excessive and/or the urine is not draining through the catheter into the bag.    CATHETER: Some patients are sent home with a Brumfield catheter, while others go home urinating on their own. A Brumfield catheter continuously drains the urine from the bladder. If you still have a catheter, the nurses will review instructions and care before you go home.     PAIN: You may take Tylenol (acetaminophen) 650-975mg and/or Motrin (ibuprofen) 400-600mg, both available over the counter, for pain every 6 hours as needed. Do not exceed 4000mg of Tylenol (acetaminophen) daily. You may alternate these medications such that you take one or the other every 3 hours for around the clock pain coverage.    STOOL SOFTENERS: Do not allow yourself to become constipated as straining may cause bleeding. Take stool softeners or a laxative (ex. Miralax, Colace, Senokot, ExLax, etc), available over the counter, if needed.    ANTICOAGULATION: If you are taking any blood thinning medications, please discuss with your urologist prior to restarting these medications unless otherwise specified.    BATHING: You may shower or bathe. If going home with brumfield, shower only until catheter is removed.    DIET: You may resume your regular diet and regular medication regimen.    ACTIVITY: No heavy lifting or strenuous exercise until you are evaluated at your post-operative appointment. Otherwise, you may return to your usual level of physical activity.    FOLLOW-UP: If you did not already schedule your post-operative appointment, please call your urologist to schedule and follow-up appointment.    CALL YOUR UROLOGIST IF: You have any bleeding that does not stop, inability to void >8 hours, fever over 100.4 F, chills, persistent nausea/vomiting, changes in your incision concerning for infection, or if your pain is not controlled on your discharge pain medications.

## 2024-07-15 NOTE — ASU PREOP CHECKLIST - ALLERGIES REVIEWED
Patient: Cedric Cedeño Date: 2024   : 1947 Attending: Samuel Jose MD   77 year old male Allergies:   ALLERGIES:   Allergen Reactions    Dairy Digestive DIARRHEA    Lactose Intolerance   (Food Or Med) DIARRHEA        Post-op Day: 2    Operation Procedure: Transurethral Resection of the Prostate (TURP)     Chief Complaint: BPH     Subjective/HPI: The patient is seen resting in bed.  His CBI was clamped at 0700.  Urine remains clear yellow via valderrama catheter.  He is planning to go on a walk now.  Discussed with patient and RN plans to pull valderrama after the walk if his urine remains clear to watermelon pink in color.    He denies chest pain, SOB, N/V, or abdominal pain.  He says he noticed some blood clots in the valderrama yesterday after he tried to have a BM.    VSS, afebrile  H/H 13.3/38.0  WBC 6.8  Cr 0.97    Vital Last Value 24 Hour Range   Temperature 97.9 °F (36.6 °C) (24) Temp  Min: 97.9 °F (36.6 °C)  Max: 99.3 °F (37.4 °C)   Pulse (!) 60 (24) Pulse  Min: 60  Max: 73   Respiratory 17 (24) Resp  Min: 16  Max: 18   Non-Invasive  Blood Pressure 111/65 (24) BP  Min: 110/68  Max: 125/67   Arterial   Blood Pressure   No data recorded   Pulse Oximetry 96 % (24) SpO2  Min: 94 %  Max: 98 %     Vital Today Admitted   Weight 85.3 kg (188 lb) (24) Weight: 85.3 kg (188 lb) (24)   Height N/A Height: 6' (182.9 cm) (24)   BMI N/A BMI (Calculated): 25.5 (24)     Intake/Output:    I/O this shift:  In: 6000 [Other:6000]  Out: 4000 [Urine:4000]    I/O last 3 completed shifts:  In: 1080 [P.O.:1080]  Out: 39523 [Urine:01083]      Intake/Output Summary (Last 24 hours) at 2024 0749  Last data filed at 2024 0702  Gross per 24 hour   Intake 7080 ml   Output 17197 ml   Net -06996 ml       Medications/Infusions:  Scheduled:   Current Facility-Administered Medications   Medication Dose Route Frequency Provider  Last Rate Last Admin    famotidine (PEPCID) tablet 20 mg  20 mg Oral BID Samuel Jose MD   20 mg at 05/04/24 0703    polyethylene glycol (MIRALAX) packet 17 g  17 g Oral Daily Eliezer Wadsworth MD   17 g at 05/03/24 2034    senna (SENOKOT) 8.6 mg  1 tablet Oral Nightly Eliezer Wadsworth MD   8.6 mg at 05/03/24 2034    losartan (COZAAR) tablet 25 mg  25 mg Oral Daily Samuel Jose MD   25 mg at 05/03/24 0841       Continuous Infusions:   Current Facility-Administered Medications   Medication Dose Route Frequency Provider Last Rate Last Admin       Physical Exam:       Head:  Normocephalic, without obvious abnormality, atraumatic.   Back:   Symmetric, no curvature, ROM normal, no CVA tenderness   Lungs:   Respirations unlabored on RA.    Abdomen:   Soft, non-tender, not distended.    Extremities: Extremities normal, atraumatic, no cyanosis or edema   Urine: Clear yellow with CBI clamped     Laboratory Results:    Lab Results   Component Value Date    WBC 6.8 05/04/2024    HCT 38.0 (L) 05/04/2024    HGB 13.3 05/04/2024     05/04/2024    BUN 15 05/04/2024    CREATININE 0.97 05/04/2024    SODIUM 139 05/04/2024    POTASSIUM 3.9 05/04/2024    CHLORIDE 110 05/04/2024    AST 15 04/11/2024    ALKPT 66 04/11/2024    BILIRUBIN 0.6 04/11/2024    CO2 28 05/04/2024    GPT 23 04/11/2024    GLUCOSE 105 (H) 05/04/2024    MG 2.3 05/04/2024    CALCIUM 9.6 05/04/2024       Urinalysis    Lab Results   Component Value Date    USPG 1.009 03/04/2021    UWBC Large (A) 03/04/2021    LEUK NONE SEEN 10/31/2017    ERYT NONE SEEN 10/31/2017    URBC Moderate (A) 03/04/2021    5UNITR negative 06/19/2013    UBILI Negative 03/04/2021    UPH 6.0 03/04/2021    UBACTR NONE SEEN 10/31/2017       Surgical Care Improvement Project:    Goran: Yes, plan to D/C  DVT/VTE Prophylaxis:   VTE Pharmacologic Prophylaxis: No pharmacologic Venous Thromboembolism prophylaxis due to Active Bleeding / Risk of Bleeding  VTE Mechanical  Prophylaxis: Yes  Antibiotics D/C'd within 24 hrs of surgery end: Yes    Central Line: No    Diagnosis/Comorbidities/Complications:  BPH with LUTS s/p TURP with Dr. TABITHA Jose 5/2/2024  Hematuria (expected post op), improved  Iron Deficiency Anemia   HTN    Plans/Recommendations:  DC valderrama after ambulation if urine remains clear to watermelon pink.  Note, 30 cc in valderrama balloon  Serial urine collection X3- save for urology to observe (place in clear containers labeled #1, #2, #3)  Bladder scan patient after next 3 voids  If > 400 ml or no void >8 hours please notify Urology  If scans are < 400 x 3 you can discontinue checking scans unless patient has symptoms.   If fail TOV can d/c with catheter.   Can d/c home if serial urines becoming more clear and pain controlled  F/u 5/17/24 with Madelin D.  Restart ASA in 2 weeks       Discussed with or notes reviewed:   Patient, RN    [facility]  Office Phone: [phone]  Pager: [phone]     I am available Monday, Wednesday, Thursday, and Friday. **If you have paged me and have not had page returned in 15 minutes - Please page [phone]**    Please page the answering service at [phone] with questions between [phone] and on weekends.      STAFF ADDENDUM  1403    Patient has urinated 4 times with amounts of 200-250 cc, with PVRs of 143, 88, and 13 cc.  Urine is clear, no clots, pink-tinged yellow in color.    After discussion with EP, hospitalist, and Dr. Jose, patient is cleared to discharge.  Patient will start metoprolol XL and discharge on a cardiac event monitor, with follow up plans with EP in 1 month.  Patient will restart ASA on 5/9/24, one week post operatively, instead of 2 weeks given clear urine at completion of voiding trial.  Noted recommendations to consider starting anticoagulation when cleared post-operatively, will defer to EP or PCP as patient was not on anticoagulation PTA.    Discharge instructions reviewed with  patient.    [facility]     done

## 2024-07-15 NOTE — BRIEF OPERATIVE NOTE - NSICDXBRIEFPROCEDURE_GEN_ALL_CORE_FT
PROCEDURES:  Transurethral resection of bladder tumor (TURBT) with biopsy 15-Jul-2024 08:34:25  Armand Cerrato

## 2024-07-15 NOTE — BRIEF OPERATIVE NOTE - OPERATION/FINDINGS
Transurethral resection of bladder tumor. Please see operative dictation for full details of the case.

## 2024-07-15 NOTE — ASU DISCHARGE PLAN (ADULT/PEDIATRIC) - NS MD DC FALL RISK RISK
For information on Fall & Injury Prevention, visit: [email or web address] OR  [email or web address] OR  https://www.cdc.gov/steadi/patient.html

## 2024-07-16 PROBLEM — I10 ESSENTIAL (PRIMARY) HYPERTENSION: Chronic | Status: ACTIVE | Noted: 2024-07-12

## 2024-07-16 LAB — SURGICAL PATHOLOGY STUDY: SIGNIFICANT CHANGE UP

## 2024-07-23 ENCOUNTER — APPOINTMENT (OUTPATIENT)
Dept: UROLOGY | Facility: CLINIC | Age: 74
End: 2024-07-23
Payer: MEDICARE

## 2024-07-23 DIAGNOSIS — C67.2 MALIGNANT NEOPLASM OF LATERAL WALL OF BLADDER: ICD-10-CM

## 2024-07-23 PROCEDURE — G2211 COMPLEX E/M VISIT ADD ON: CPT

## 2024-07-23 PROCEDURE — 99214 OFFICE O/P EST MOD 30 MIN: CPT

## 2024-07-24 PROBLEM — K76.0 FATTY (CHANGE OF) LIVER, NOT ELSEWHERE CLASSIFIED: Chronic | Status: ACTIVE | Noted: 2024-07-12

## 2024-07-24 PROBLEM — E78.5 HYPERLIPIDEMIA, UNSPECIFIED: Chronic | Status: ACTIVE | Noted: 2024-07-12

## 2024-07-24 PROBLEM — G47.33 OBSTRUCTIVE SLEEP APNEA (ADULT) (PEDIATRIC): Chronic | Status: ACTIVE | Noted: 2024-07-12

## 2024-07-24 PROBLEM — I25.10 ATHEROSCLEROTIC HEART DISEASE OF NATIVE CORONARY ARTERY WITHOUT ANGINA PECTORIS: Chronic | Status: ACTIVE | Noted: 2024-07-12

## 2024-07-24 LAB
APPEARANCE: CLEAR
BACTERIA: NEGATIVE /HPF
BILIRUBIN URINE: NEGATIVE
BLOOD URINE: ABNORMAL
CAST: 0 /LPF
COLOR: YELLOW
EPITHELIAL CELLS: 1 /HPF
GLUCOSE QUALITATIVE U: NEGATIVE MG/DL
KETONES URINE: NEGATIVE MG/DL
LEUKOCYTE ESTERASE URINE: ABNORMAL
MICROSCOPIC-UA: NORMAL
NITRITE URINE: NEGATIVE
PH URINE: 5.5
PROTEIN URINE: 30 MG/DL
RED BLOOD CELLS URINE: 76 /HPF
SPECIFIC GRAVITY URINE: 1.02
UROBILINOGEN URINE: 1 MG/DL
WHITE BLOOD CELLS URINE: 18 /HPF

## 2024-07-29 ENCOUNTER — TRANSCRIPTION ENCOUNTER (OUTPATIENT)
Age: 74
End: 2024-07-29

## 2024-07-29 LAB — BACTERIA UR CULT: ABNORMAL

## 2024-07-30 DIAGNOSIS — N39.0 URINARY TRACT INFECTION, SITE NOT SPECIFIED: ICD-10-CM

## 2024-07-30 RX ORDER — CIPROFLOXACIN HYDROCHLORIDE 500 MG/1
500 TABLET, FILM COATED ORAL
Qty: 14 | Refills: 0 | Status: ACTIVE | COMMUNITY
Start: 2024-07-30 | End: 1900-01-01

## 2024-08-27 ENCOUNTER — TRANSCRIPTION ENCOUNTER (OUTPATIENT)
Age: 74
End: 2024-08-27

## 2024-08-27 NOTE — ASU PATIENT PROFILE, ADULT - NSICDXPASTSURGICALHX_GEN_ALL_CORE_FT
PAST SURGICAL HISTORY:  H/O transurethral resection of bladder tumor (TURBT)     History of ankle surgery right    History of melanoma excision right thigh    S/P cholecystectomy

## 2024-08-28 ENCOUNTER — OUTPATIENT (OUTPATIENT)
Dept: OUTPATIENT SERVICES | Facility: HOSPITAL | Age: 74
LOS: 1 days | Discharge: HOME | End: 2024-08-28
Payer: MEDICARE

## 2024-08-28 ENCOUNTER — RESULT REVIEW (OUTPATIENT)
Age: 74
End: 2024-08-28

## 2024-08-28 ENCOUNTER — APPOINTMENT (OUTPATIENT)
Dept: UROLOGY | Facility: AMBULATORY SURGERY CENTER | Age: 74
End: 2024-08-28

## 2024-08-28 VITALS
HEART RATE: 56 BPM | WEIGHT: 264.33 LBS | TEMPERATURE: 97 F | RESPIRATION RATE: 16 BRPM | DIASTOLIC BLOOD PRESSURE: 75 MMHG | SYSTOLIC BLOOD PRESSURE: 128 MMHG | HEIGHT: 71 IN | OXYGEN SATURATION: 97 %

## 2024-08-28 VITALS
DIASTOLIC BLOOD PRESSURE: 72 MMHG | OXYGEN SATURATION: 97 % | SYSTOLIC BLOOD PRESSURE: 123 MMHG | HEART RATE: 56 BPM | RESPIRATION RATE: 16 BRPM | TEMPERATURE: 97 F

## 2024-08-28 DIAGNOSIS — Z98.890 OTHER SPECIFIED POSTPROCEDURAL STATES: Chronic | ICD-10-CM

## 2024-08-28 DIAGNOSIS — Z90.49 ACQUIRED ABSENCE OF OTHER SPECIFIED PARTS OF DIGESTIVE TRACT: Chronic | ICD-10-CM

## 2024-08-28 LAB — GLUCOSE BLDC GLUCOMTR-MCNC: 109 MG/DL — HIGH (ref 70–99)

## 2024-08-28 PROCEDURE — 88305 TISSUE EXAM BY PATHOLOGIST: CPT | Mod: 26

## 2024-08-28 PROCEDURE — 52235 CYSTOSCOPY AND TREATMENT: CPT

## 2024-08-28 RX ORDER — FENTANYL CITRATE 50 UG/ML
25 INJECTION INTRAMUSCULAR; INTRAVENOUS
Refills: 0 | Status: DISCONTINUED | OUTPATIENT
Start: 2024-08-28 | End: 2024-08-28

## 2024-08-28 RX ORDER — PHENAZOPYRIDINE HCL 100 MG
100 TABLET ORAL ONCE
Refills: 0 | Status: DISCONTINUED | OUTPATIENT
Start: 2024-08-28 | End: 2024-08-28

## 2024-08-28 RX ORDER — ACETAMINOPHEN 325 MG/1
1000 TABLET ORAL ONCE
Refills: 0 | Status: COMPLETED | OUTPATIENT
Start: 2024-08-28 | End: 2024-08-28

## 2024-08-28 RX ORDER — FENTANYL CITRATE 50 UG/ML
50 INJECTION INTRAMUSCULAR; INTRAVENOUS
Refills: 0 | Status: DISCONTINUED | OUTPATIENT
Start: 2024-08-28 | End: 2024-08-28

## 2024-08-28 RX ORDER — TAMSULOSIN HYDROCHLORIDE 0.4 MG/1
0.4 CAPSULE ORAL ONCE
Refills: 0 | Status: DISCONTINUED | OUTPATIENT
Start: 2024-08-28 | End: 2024-08-28

## 2024-08-28 RX ORDER — DIAZEPAM 10 MG
5 TABLET ORAL ONCE
Refills: 0 | Status: DISCONTINUED | OUTPATIENT
Start: 2024-08-28 | End: 2024-08-28

## 2024-08-28 RX ORDER — ONDANSETRON 2 MG/ML
4 INJECTION, SOLUTION INTRAMUSCULAR; INTRAVENOUS ONCE
Refills: 0 | Status: DISCONTINUED | OUTPATIENT
Start: 2024-08-28 | End: 2024-08-28

## 2024-08-28 RX ADMIN — Medication 100 MILLILITER(S): at 13:25

## 2024-08-28 RX ADMIN — ACETAMINOPHEN 1000 MILLIGRAM(S): 325 TABLET ORAL at 10:29

## 2024-08-28 NOTE — ASU DISCHARGE PLAN (ADULT/PEDIATRIC) - ASU DC SPECIAL INSTRUCTIONSFT
TRANSURETHRAL RESECTION OF BLADDER TUMOR    GENERAL: It is common to have blood in your urine after your procedure.  It may be pink or even red; and it is important to increase fluid intake to 2-3L of water per day to keep the urine as clear as possible. Please inform your doctor if you have a significant amount of clot in the urine or if you are unable to void at all.  The urine may clear and then become bloody again especially as you are more physically active. It is not uncommon to have some burning when you urinate, this will gradually improve.     PAIN: You may take Tylenol (acetaminophen) 650-975mg and/or Motrin (ibuprofen) 400-600mg, both available over the counter, for pain every 6 hours as needed. Do not exceed 4000mg of Tylenol (acetaminophen) daily. You may alternate these medications such that you take one or the other every 3 hours for around the clock pain coverage.    ANTIBIOTICS: Continue your antibiotics as prescribed.    STOOL SOFTENERS: Do not allow yourself to become constipated as straining may cause bleeding. Take stool softeners or a laxative (ex. Miralax, Colace, Senokot, ExLax, etc), available over the counter, if needed.    ANTICOAGULATION: If you are taking any blood thinning medications, please discuss with your urologist prior to restarting these medications unless otherwise specified.    BATHING: You may shower or bathe. If going home with brumfield, shower only until catheter is removed.    DIET: You may resume your regular diet and regular medication regimen.    ACTIVITY: No heavy lifting or strenuous exercise until you are evaluated at your post-operative appointment. Otherwise, you may return to your usual level of physical activity.    FOLLOW-UP: If you did not already schedule your post-operative appointment, please call your urologist to schedule and follow-up appointment.    CALL YOUR UROLOGIST IF: You have any bleeding that does not stop, inability to void >8 hours, fever over 100.4 F, chills, persistent nausea/vomiting, changes in your incision concerning for infection, or if your pain is not controlled on your discharge pain medications.

## 2024-08-28 NOTE — BRIEF OPERATIVE NOTE - OPERATION/FINDINGS
hx of HGT1 left lateral wall. Now re resected bed of bladder tumor 4 cm wide. Meatal dilation done at beginning of case

## 2024-08-28 NOTE — ASU DISCHARGE PLAN (ADULT/PEDIATRIC) - CARE PROVIDER_API CALL
Skylar Campbell.  Urology  [address]  [place] [address]  Phone: [phone]  Fax: [phone]  Follow Up Time: Routine   PAIN

## 2024-08-30 LAB — SURGICAL PATHOLOGY STUDY: SIGNIFICANT CHANGE UP

## 2024-09-10 ENCOUNTER — APPOINTMENT (OUTPATIENT)
Dept: UROLOGY | Facility: CLINIC | Age: 74
End: 2024-09-10
Payer: MEDICARE

## 2024-09-10 VITALS
BODY MASS INDEX: 32.2 KG/M2 | DIASTOLIC BLOOD PRESSURE: 80 MMHG | TEMPERATURE: 98.1 F | WEIGHT: 230 LBS | HEART RATE: 81 BPM | SYSTOLIC BLOOD PRESSURE: 157 MMHG | HEIGHT: 71 IN

## 2024-09-10 VITALS
TEMPERATURE: 97.9 F | WEIGHT: 230 LBS | SYSTOLIC BLOOD PRESSURE: 112 MMHG | DIASTOLIC BLOOD PRESSURE: 79 MMHG | HEART RATE: 99 BPM | HEIGHT: 71 IN | BODY MASS INDEX: 32.2 KG/M2

## 2024-09-10 DIAGNOSIS — R97.20 BENIGN PROSTATIC HYPERPLASIA WITHOUT LOWER URINARY TRACT SYMPMS: ICD-10-CM

## 2024-09-10 DIAGNOSIS — N40.0 BENIGN PROSTATIC HYPERPLASIA WITHOUT LOWER URINARY TRACT SYMPMS: ICD-10-CM

## 2024-09-10 DIAGNOSIS — C67.9 MALIGNANT NEOPLASM OF BLADDER, UNSPECIFIED: ICD-10-CM

## 2024-09-10 PROCEDURE — G2211 COMPLEX E/M VISIT ADD ON: CPT

## 2024-09-10 PROCEDURE — 99214 OFFICE O/P EST MOD 30 MIN: CPT

## 2024-09-12 NOTE — HISTORY OF PRESENT ILLNESS
[FreeTextEntry1] : 74M with HGT1 UC left lateral wall tumor s/p repeat TURBT (negative) 8/28/24   Initial TURBT 7/15/24: HGT1 UC, muscularis propria negative for tumor  Repeat TURBT 8/28/24: tissue with extensive prior biopsy changes   No dysuria or hematuria. Baseline BPH with good stream, has incomplete emptying, urinary frequency, on tamsulosin.  No f/c/n/v.

## 2024-09-12 NOTE — ASSESSMENT
[FreeTextEntry1] : HGT1 UC, with repeat TURBT  8/2024 showing extensive biopsy changes  Discussed BCG and side effects inclduing risks dysruia, fevers, hematuria, BCGosis UA/UCx today for itnravesical BCG x 6

## 2024-09-13 LAB
APPEARANCE: CLEAR
BACTERIA UR CULT: NORMAL
BACTERIA: NEGATIVE /HPF
BILIRUBIN URINE: NEGATIVE
BLOOD URINE: ABNORMAL
CAST: 0 /LPF
COLOR: YELLOW
EPITHELIAL CELLS: 2 /HPF
GLUCOSE QUALITATIVE U: NEGATIVE MG/DL
KETONES URINE: ABNORMAL MG/DL
LEUKOCYTE ESTERASE URINE: ABNORMAL
MICROSCOPIC-UA: NORMAL
NITRITE URINE: NEGATIVE
PH URINE: 5.5
PROTEIN URINE: 30 MG/DL
RED BLOOD CELLS URINE: 76 /HPF
SPECIFIC GRAVITY URINE: 1.02
UROBILINOGEN URINE: 1 MG/DL
WHITE BLOOD CELLS URINE: 22 /HPF

## 2024-10-08 ENCOUNTER — NON-APPOINTMENT (OUTPATIENT)
Age: 74
End: 2024-10-08

## 2024-10-08 ENCOUNTER — APPOINTMENT (OUTPATIENT)
Dept: UROLOGY | Facility: CLINIC | Age: 74
End: 2024-10-08
Payer: MEDICARE

## 2024-10-08 DIAGNOSIS — N13.8 BENIGN PROSTATIC HYPERPLASIA WITH LOWER URINARY TRACT SYMPMS: ICD-10-CM

## 2024-10-08 DIAGNOSIS — N40.1 BENIGN PROSTATIC HYPERPLASIA WITH LOWER URINARY TRACT SYMPMS: ICD-10-CM

## 2024-10-08 PROCEDURE — 51720 TREATMENT OF BLADDER LESION: CPT

## 2024-10-08 RX ORDER — BACILLUS CALMETTE-GUERIN 50 MG/50ML
50 POWDER, FOR SUSPENSION INTRAVESICAL
Refills: 0 | Status: COMPLETED | OUTPATIENT
Start: 2024-10-08

## 2024-10-09 LAB
APPEARANCE: CLEAR
BACTERIA: NEGATIVE /HPF
BILIRUBIN URINE: NEGATIVE
BLOOD URINE: NEGATIVE
CAST: 0 /LPF
COLOR: YELLOW
EPITHELIAL CELLS: 0 /HPF
GLUCOSE QUALITATIVE U: NEGATIVE MG/DL
KETONES URINE: NEGATIVE MG/DL
LEUKOCYTE ESTERASE URINE: ABNORMAL
MICROSCOPIC-UA: NORMAL
NITRITE URINE: NEGATIVE
PH URINE: 5.5
PROTEIN URINE: NORMAL MG/DL
RED BLOOD CELLS URINE: 2 /HPF
SPECIFIC GRAVITY URINE: 1.02
UROBILINOGEN URINE: 0.2 MG/DL
WHITE BLOOD CELLS URINE: 3 /HPF

## 2024-10-10 LAB — BACTERIA UR CULT: NORMAL

## 2024-10-15 ENCOUNTER — APPOINTMENT (OUTPATIENT)
Dept: UROLOGY | Facility: CLINIC | Age: 74
End: 2024-10-15
Payer: MEDICARE

## 2024-10-15 PROCEDURE — 51720 TREATMENT OF BLADDER LESION: CPT

## 2024-10-15 RX ORDER — BACILLUS CALMETTE-GUERIN 50 MG/50ML
50 POWDER, FOR SUSPENSION INTRAVESICAL
Refills: 0 | Status: COMPLETED | OUTPATIENT
Start: 2024-10-15

## 2024-10-15 RX ADMIN — Medication 0 MG: at 00:00

## 2024-10-16 LAB
APPEARANCE: ABNORMAL
BACTERIA: ABNORMAL /HPF
BILIRUBIN URINE: NEGATIVE
BLOOD URINE: ABNORMAL
CAST: 2 /LPF
COLOR: NORMAL
EPITHELIAL CELLS: 1 /HPF
GLUCOSE QUALITATIVE U: NEGATIVE MG/DL
KETONES URINE: ABNORMAL MG/DL
LEUKOCYTE ESTERASE URINE: ABNORMAL
MICROSCOPIC-UA: NORMAL
NITRITE URINE: NEGATIVE
PH URINE: 5.5
PROTEIN URINE: 100 MG/DL
RED BLOOD CELLS URINE: 4 /HPF
REVIEW: NORMAL
SPECIFIC GRAVITY URINE: 1.03
UROBILINOGEN URINE: 1 MG/DL
WHITE BLOOD CELLS URINE: 471 /HPF

## 2024-10-21 PROBLEM — N39.0 URINARY TRACT INFECTION WITHOUT HEMATURIA, SITE UNSPECIFIED: Status: ACTIVE | Noted: 2024-07-08

## 2024-10-21 LAB — BACTERIA UR CULT: ABNORMAL

## 2024-10-22 ENCOUNTER — APPOINTMENT (OUTPATIENT)
Dept: UROLOGY | Facility: CLINIC | Age: 74
End: 2024-10-22

## 2024-10-22 ENCOUNTER — NON-APPOINTMENT (OUTPATIENT)
Age: 74
End: 2024-10-22

## 2024-10-22 DIAGNOSIS — N39.0 URINARY TRACT INFECTION, SITE NOT SPECIFIED: ICD-10-CM

## 2024-10-25 ENCOUNTER — APPOINTMENT (OUTPATIENT)
Dept: UROLOGY | Facility: CLINIC | Age: 74
End: 2024-10-25
Payer: MEDICARE

## 2024-10-25 VITALS
SYSTOLIC BLOOD PRESSURE: 123 MMHG | HEIGHT: 71 IN | DIASTOLIC BLOOD PRESSURE: 72 MMHG | TEMPERATURE: 97.7 F | HEART RATE: 84 BPM | BODY MASS INDEX: 32.2 KG/M2 | WEIGHT: 230 LBS

## 2024-10-25 DIAGNOSIS — C67.9 MALIGNANT NEOPLASM OF BLADDER, UNSPECIFIED: ICD-10-CM

## 2024-10-25 PROCEDURE — 51720 TREATMENT OF BLADDER LESION: CPT

## 2024-10-25 RX ORDER — BACILLUS CALMETTE-GUERIN 50 MG/50ML
50 POWDER, FOR SUSPENSION INTRAVESICAL
Refills: 0 | Status: COMPLETED | OUTPATIENT
Start: 2024-10-25

## 2024-10-25 RX ADMIN — Medication 0 MG: at 00:00

## 2024-10-26 NOTE — ASU PATIENT PROFILE, ADULT - PREOP PAIN SCORE
Bedside report received.  Assessment complete.  A&O x 4. Patient calls appropriately.  Patient ambulates with x2 assist to pivot.    Patient has 7/10 pain. Patient medicated per MAR.  Denies N&V. Tolerating regular diet.  Surgical LLE incision w/steri-strips, CDI.  + void, + flatus, LBM 10/25.  Patient denies SOB.  Review plan with of care with patient. Call light and personal belongings within reach. Hourly rounding in place. All needs met at this time.   0

## 2024-10-29 ENCOUNTER — APPOINTMENT (OUTPATIENT)
Dept: UROLOGY | Facility: CLINIC | Age: 74
End: 2024-10-29
Payer: MEDICARE

## 2024-10-29 ENCOUNTER — NON-APPOINTMENT (OUTPATIENT)
Age: 74
End: 2024-10-29

## 2024-10-29 VITALS
SYSTOLIC BLOOD PRESSURE: 139 MMHG | DIASTOLIC BLOOD PRESSURE: 79 MMHG | WEIGHT: 230 LBS | BODY MASS INDEX: 32.2 KG/M2 | HEART RATE: 79 BPM | TEMPERATURE: 97.7 F | HEIGHT: 71 IN

## 2024-10-29 PROCEDURE — 51720 TREATMENT OF BLADDER LESION: CPT

## 2024-10-29 RX ORDER — BACILLUS CALMETTE-GUERIN 50 MG/50ML
50 POWDER, FOR SUSPENSION INTRAVESICAL
Refills: 0 | Status: COMPLETED | OUTPATIENT
Start: 2024-10-29

## 2024-10-29 RX ADMIN — Medication 0 MG: at 00:00

## 2024-10-30 LAB
APPEARANCE: CLEAR
BACTERIA: NEGATIVE /HPF
BILIRUBIN URINE: NEGATIVE
BLOOD URINE: NEGATIVE
CAST: 0 /LPF
COLOR: YELLOW
EPITHELIAL CELLS: 1 /HPF
GLUCOSE QUALITATIVE U: NEGATIVE MG/DL
KETONES URINE: NEGATIVE MG/DL
LEUKOCYTE ESTERASE URINE: ABNORMAL
MICROSCOPIC-UA: NORMAL
NITRITE URINE: NEGATIVE
PH URINE: 5.5
PROTEIN URINE: NEGATIVE MG/DL
RED BLOOD CELLS URINE: 2 /HPF
SPECIFIC GRAVITY URINE: 1.02
UROBILINOGEN URINE: 0.2 MG/DL
WHITE BLOOD CELLS URINE: 6 /HPF

## 2024-10-31 LAB — BACTERIA UR CULT: NORMAL

## 2024-11-01 RX ORDER — AMOXICILLIN AND CLAVULANATE POTASSIUM 875; 125 MG/1; MG/1
875-125 TABLET, COATED ORAL
Qty: 14 | Refills: 0 | Status: ACTIVE | COMMUNITY
Start: 2024-11-01 | End: 1900-01-01

## 2024-11-01 RX ORDER — CIPROFLOXACIN HYDROCHLORIDE 500 MG/1
500 TABLET, FILM COATED ORAL
Qty: 14 | Refills: 0 | Status: ACTIVE | COMMUNITY
Start: 2024-10-21 | End: 1900-01-01

## 2024-11-08 ENCOUNTER — APPOINTMENT (OUTPATIENT)
Dept: UROLOGY | Facility: CLINIC | Age: 74
End: 2024-11-08
Payer: MEDICARE

## 2024-11-08 VITALS
BODY MASS INDEX: 32.2 KG/M2 | TEMPERATURE: 96.1 F | DIASTOLIC BLOOD PRESSURE: 74 MMHG | SYSTOLIC BLOOD PRESSURE: 145 MMHG | HEIGHT: 71 IN | WEIGHT: 230 LBS | HEART RATE: 92 BPM

## 2024-11-08 DIAGNOSIS — C67.9 MALIGNANT NEOPLASM OF BLADDER, UNSPECIFIED: ICD-10-CM

## 2024-11-08 DIAGNOSIS — R97.20 BENIGN PROSTATIC HYPERPLASIA WITHOUT LOWER URINARY TRACT SYMPMS: ICD-10-CM

## 2024-11-08 DIAGNOSIS — N40.0 BENIGN PROSTATIC HYPERPLASIA WITHOUT LOWER URINARY TRACT SYMPMS: ICD-10-CM

## 2024-11-08 PROCEDURE — 51720 TREATMENT OF BLADDER LESION: CPT

## 2024-11-08 RX ORDER — BACILLUS CALMETTE-GUERIN 50 MG/50ML
50 POWDER, FOR SUSPENSION INTRAVESICAL
Refills: 0 | Status: COMPLETED | OUTPATIENT
Start: 2024-11-08

## 2024-11-08 RX ADMIN — Medication 0 MG: at 00:00

## 2024-11-11 LAB
APPEARANCE: CLEAR
BACTERIA UR CULT: NORMAL
BACTERIA: NEGATIVE /HPF
BILIRUBIN URINE: NEGATIVE
BLOOD URINE: NEGATIVE
CAST: 0 /LPF
COLOR: YELLOW
EPITHELIAL CELLS: 1 /HPF
GLUCOSE QUALITATIVE U: NEGATIVE MG/DL
KETONES URINE: NEGATIVE MG/DL
LEUKOCYTE ESTERASE URINE: ABNORMAL
MICROSCOPIC-UA: NORMAL
NITRITE URINE: NEGATIVE
PH URINE: 5.5
PROTEIN URINE: NEGATIVE MG/DL
RED BLOOD CELLS URINE: 2 /HPF
SPECIFIC GRAVITY URINE: 1.01
UROBILINOGEN URINE: 0.2 MG/DL
WHITE BLOOD CELLS URINE: 2 /HPF

## 2024-11-14 ENCOUNTER — APPOINTMENT (OUTPATIENT)
Dept: UROLOGY | Facility: CLINIC | Age: 74
End: 2024-11-14
Payer: MEDICARE

## 2024-11-14 ENCOUNTER — NON-APPOINTMENT (OUTPATIENT)
Age: 74
End: 2024-11-14

## 2024-11-14 PROCEDURE — 51720 TREATMENT OF BLADDER LESION: CPT

## 2024-11-14 RX ORDER — BACILLUS CALMETTE-GUERIN 50 MG/50ML
50 POWDER, FOR SUSPENSION INTRAVESICAL
Refills: 0 | Status: COMPLETED | OUTPATIENT
Start: 2024-11-14

## 2024-11-14 RX ADMIN — Medication 0 MG: at 00:00

## 2024-12-27 ENCOUNTER — APPOINTMENT (OUTPATIENT)
Dept: UROLOGY | Facility: CLINIC | Age: 74
End: 2024-12-27
Payer: MEDICARE

## 2024-12-27 DIAGNOSIS — C67.9 MALIGNANT NEOPLASM OF BLADDER, UNSPECIFIED: ICD-10-CM

## 2024-12-27 PROCEDURE — 52000 CYSTOURETHROSCOPY: CPT

## 2024-12-29 LAB
APPEARANCE: CLEAR
BACTERIA UR CULT: ABNORMAL
BACTERIA: NEGATIVE /HPF
BILIRUBIN URINE: NEGATIVE
BLOOD URINE: NEGATIVE
CAST: 1 /LPF
COLOR: YELLOW
EPITHELIAL CELLS: 1 /HPF
GLUCOSE QUALITATIVE U: NEGATIVE MG/DL
KETONES URINE: NEGATIVE MG/DL
LEUKOCYTE ESTERASE URINE: ABNORMAL
MICROSCOPIC-UA: NORMAL
NITRITE URINE: POSITIVE
PH URINE: 6.5
PROTEIN URINE: NEGATIVE MG/DL
RED BLOOD CELLS URINE: 1 /HPF
REVIEW: NORMAL
SPECIFIC GRAVITY URINE: 1.01
UROBILINOGEN URINE: 0.2 MG/DL
WBC CLUMPS: PRESENT
WHITE BLOOD CELLS URINE: 55 /HPF

## 2024-12-30 ENCOUNTER — TRANSCRIPTION ENCOUNTER (OUTPATIENT)
Age: 74
End: 2024-12-30

## 2024-12-31 ENCOUNTER — TRANSCRIPTION ENCOUNTER (OUTPATIENT)
Age: 74
End: 2024-12-31

## 2024-12-31 LAB — URINE CYTOLOGY: NORMAL

## 2024-12-31 RX ORDER — SULFAMETHOXAZOLE AND TRIMETHOPRIM 800; 160 MG/1; MG/1
800-160 TABLET ORAL
Qty: 14 | Refills: 0 | Status: ACTIVE | COMMUNITY
Start: 2024-12-30 | End: 1900-01-01

## 2025-01-02 ENCOUNTER — TRANSCRIPTION ENCOUNTER (OUTPATIENT)
Age: 75
End: 2025-01-02

## 2025-03-28 ENCOUNTER — NON-APPOINTMENT (OUTPATIENT)
Age: 75
End: 2025-03-28

## 2025-03-28 ENCOUNTER — APPOINTMENT (OUTPATIENT)
Dept: UROLOGY | Facility: CLINIC | Age: 75
End: 2025-03-28
Payer: MEDICARE

## 2025-03-28 VITALS
WEIGHT: 230 LBS | BODY MASS INDEX: 32.2 KG/M2 | HEART RATE: 77 BPM | SYSTOLIC BLOOD PRESSURE: 143 MMHG | TEMPERATURE: 97.9 F | DIASTOLIC BLOOD PRESSURE: 76 MMHG | HEIGHT: 71 IN

## 2025-03-28 DIAGNOSIS — C67.9 MALIGNANT NEOPLASM OF BLADDER, UNSPECIFIED: ICD-10-CM

## 2025-03-28 PROCEDURE — 99214 OFFICE O/P EST MOD 30 MIN: CPT | Mod: 25,57

## 2025-03-28 PROCEDURE — 52000 CYSTOURETHROSCOPY: CPT

## 2025-03-31 LAB
APPEARANCE: CLEAR
BACTERIA UR CULT: NORMAL
BACTERIA: NEGATIVE /HPF
BILIRUBIN URINE: NEGATIVE
BLOOD URINE: NEGATIVE
CAST: 0 /LPF
COLOR: YELLOW
EPITHELIAL CELLS: 0 /HPF
GLUCOSE QUALITATIVE U: NEGATIVE MG/DL
KETONES URINE: NEGATIVE MG/DL
LEUKOCYTE ESTERASE URINE: NEGATIVE
MICROSCOPIC-UA: NORMAL
NITRITE URINE: NEGATIVE
PH URINE: 5.5
PROTEIN URINE: NEGATIVE MG/DL
RED BLOOD CELLS URINE: 0 /HPF
SPECIFIC GRAVITY URINE: 1.01
UROBILINOGEN URINE: 0.2 MG/DL
WHITE BLOOD CELLS URINE: 0 /HPF

## 2025-04-22 NOTE — ASU PATIENT PROFILE, ADULT - FALL HARM RISK - UNIVERSAL INTERVENTIONS
Bed in lowest position, wheels locked, appropriate side rails in place/Call bell, personal items and telephone in reach/Instruct patient to call for assistance before getting out of bed or chair/Non-slip footwear when patient is out of bed/[place] to call system/Physically safe environment - no spills, clutter or unnecessary equipment/Purposeful Proactive Rounding/Room/bathroom lighting operational, light cord in reach

## 2025-04-22 NOTE — ASU PATIENT PROFILE, ADULT - NSICDXPASTMEDICALHX_GEN_ALL_CORE_FT
PAST MEDICAL HISTORY:  CAD (coronary artery disease)     Fatty liver     HLD (hyperlipidemia)     HTN (hypertension)     DANIELA on CPAP      PAST MEDICAL HISTORY:  CAD (coronary artery disease)     Cancer of bladder     Fatty liver     HLD (hyperlipidemia)     HTN (hypertension)     DANIELA on CPAP

## 2025-04-23 ENCOUNTER — TRANSCRIPTION ENCOUNTER (OUTPATIENT)
Age: 75
End: 2025-04-23

## 2025-04-23 ENCOUNTER — APPOINTMENT (OUTPATIENT)
Dept: UROLOGY | Facility: AMBULATORY SURGERY CENTER | Age: 75
End: 2025-04-23

## 2025-04-23 ENCOUNTER — OUTPATIENT (OUTPATIENT)
Dept: OUTPATIENT SERVICES | Facility: HOSPITAL | Age: 75
LOS: 1 days | Discharge: HOME | End: 2025-04-23
Payer: MEDICARE

## 2025-04-23 ENCOUNTER — RESULT REVIEW (OUTPATIENT)
Age: 75
End: 2025-04-23

## 2025-04-23 VITALS
RESPIRATION RATE: 12 BRPM | HEART RATE: 55 BPM | DIASTOLIC BLOOD PRESSURE: 57 MMHG | TEMPERATURE: 97 F | OXYGEN SATURATION: 95 % | SYSTOLIC BLOOD PRESSURE: 112 MMHG

## 2025-04-23 VITALS
OXYGEN SATURATION: 96 % | TEMPERATURE: 98 F | RESPIRATION RATE: 16 BRPM | HEART RATE: 61 BPM | HEIGHT: 71 IN | WEIGHT: 244.05 LBS | SYSTOLIC BLOOD PRESSURE: 143 MMHG | DIASTOLIC BLOOD PRESSURE: 72 MMHG

## 2025-04-23 DIAGNOSIS — Z98.890 OTHER SPECIFIED POSTPROCEDURAL STATES: Chronic | ICD-10-CM

## 2025-04-23 DIAGNOSIS — Z90.49 ACQUIRED ABSENCE OF OTHER SPECIFIED PARTS OF DIGESTIVE TRACT: Chronic | ICD-10-CM

## 2025-04-23 PROCEDURE — 52214 CYSTOSCOPY AND TREATMENT: CPT

## 2025-04-23 PROCEDURE — 88307 TISSUE EXAM BY PATHOLOGIST: CPT | Mod: 26

## 2025-04-23 PROCEDURE — 88112 CYTOPATH CELL ENHANCE TECH: CPT | Mod: 26

## 2025-04-23 RX ORDER — PHENAZOPYRIDINE HCL 100 MG
100 TABLET ORAL ONCE
Refills: 0 | Status: COMPLETED | OUTPATIENT
Start: 2025-04-23 | End: 2026-03-22

## 2025-04-23 RX ORDER — PHENAZOPYRIDINE HCL 100 MG
100 TABLET ORAL ONCE
Refills: 0 | Status: DISCONTINUED | OUTPATIENT
Start: 2025-04-23 | End: 2025-04-23

## 2025-04-23 RX ORDER — TAMSULOSIN HYDROCHLORIDE 0.4 MG/1
0.4 CAPSULE ORAL ONCE
Refills: 0 | Status: COMPLETED | OUTPATIENT
Start: 2025-04-23 | End: 2026-03-22

## 2025-04-23 RX ORDER — ATORVASTATIN CALCIUM 80 MG/1
1 TABLET, FILM COATED ORAL
Refills: 0 | DISCHARGE

## 2025-04-23 RX ORDER — TAMSULOSIN HYDROCHLORIDE 0.4 MG/1
0.4 CAPSULE ORAL ONCE
Refills: 0 | Status: DISCONTINUED | OUTPATIENT
Start: 2025-04-23 | End: 2025-04-23

## 2025-04-23 RX ORDER — ONDANSETRON HCL/PF 4 MG/2 ML
4 VIAL (ML) INJECTION ONCE
Refills: 0 | Status: DISCONTINUED | OUTPATIENT
Start: 2025-04-23 | End: 2025-04-23

## 2025-04-23 RX ORDER — SODIUM CHLORIDE 9 G/1000ML
500 INJECTION, SOLUTION INTRAVENOUS
Refills: 0 | Status: DISCONTINUED | OUTPATIENT
Start: 2025-04-23 | End: 2025-04-23

## 2025-04-23 RX ORDER — LISINOPRIL AND HYDROCHLOROTHIAZIDE 12.5; 2 MG/1; MG/1
1 TABLET ORAL
Refills: 0 | DISCHARGE

## 2025-04-23 RX ORDER — AMLODIPINE BESYLATE 10 MG/1
1 TABLET ORAL
Refills: 0 | DISCHARGE

## 2025-04-23 RX ORDER — FENTANYL CITRATE-0.9 % NACL/PF 100MCG/2ML
25 SYRINGE (ML) INTRAVENOUS
Refills: 0 | Status: DISCONTINUED | OUTPATIENT
Start: 2025-04-23 | End: 2025-04-23

## 2025-04-23 NOTE — ASU DISCHARGE PLAN (ADULT/PEDIATRIC) - NS MD DC FALL RISK RISK
For information on Fall & Injury Prevention, visit: [email or web address] OR  [email or web address] OR  https://www.cdc.gov/steadi/patient.html

## 2025-04-23 NOTE — ASU DISCHARGE PLAN (ADULT/PEDIATRIC) - FINANCIAL ASSISTANCE
[facility] provides services at a reduced cost to those who are determined to be eligible through [facility]’s financial assistance program. Information regarding [facility]’s financial assistance program can be found by going to [email or web address] or by calling [phone].

## 2025-04-23 NOTE — ASU DISCHARGE PLAN (ADULT/PEDIATRIC) - ASU DISCHARGE DATE/TIME
00-03% stenosis; LICA 53-66% stenosis    Cellulitis 12/2013    LLE    CHF with unknown LVEF ([facility] [address] 1/13/2020    Chronic back pain     Diverticular disease     Erectile dysfunction     GERD (gastroesophageal reflux disease)     History of atrial fibrillation     Hypercholesteremia     Hypertension     Lower GI bleed     MDRO (multiple drug resistant organisms) resistance 10/26/2019    urine    Neuromuscular disorder (HCC)     spasticity    Renal insufficiency     Risk for falls     uses walker    Tinea corporis 12/2013    LLE    Vitamin B12 deficiency      Past Surgical History:        Procedure Laterality Date    BACK SURGERY  2006    lower lumbar    CORONARY ARTERY BYPASS GRAFT  12/13/2013    CABG x 5 (Dr Sravani Inman)   [facility] CYSTOSCOPY N/A 1/10/2019    CYSTOSCOPY performed by Indra Bruner MD at [facility] [address] Right 5/1/2015    [facility]     Current Medications:    Current Facility-Administered Medications: zolpidem (AMBIEN) tablet 10 mg, 10 mg, Oral, Nightly  famotidine (PEPCID) tablet 20 mg, 20 mg, Oral, BID  metoprolol succinate (TOPROL XL) extended release tablet 25 mg, 25 mg, Oral, Daily  gabapentin (NEURONTIN) tablet 1,800 mg, 1,800 mg, Oral, Nightly  gabapentin (NEURONTIN) tablet 1,200 mg, 1,200 mg, Oral, Lunch  atorvastatin (LIPITOR) tablet 80 mg, 80 mg, Oral, Nightly  aspirin tablet 325 mg, 325 mg, Oral, Nightly  furosemide (LASIX) injection 40 mg, 40 mg, Intravenous, BID  sodium chloride flush 0.9 % injection 10 mL, 10 mL, Intravenous, 2 times per day  sodium chloride flush 0.9 % injection 10 mL, 10 mL, Intravenous, PRN  magnesium hydroxide (MILK OF MAGNESIA) 400 MG/5ML suspension 30 mL, 30 mL, Oral, Daily PRN  ondansetron (ZOFRAN) injection 4 mg, 4 mg, Intravenous, Q4H PRN  enoxaparin (LOVENOX) injection 40 mg, 40 mg, Subcutaneous, Daily  acetaminophen (TYLENOL) tablet 650 mg, 650 mg, Oral, Q4H PRN  Allergies:   Bactrim [sulfamethoxazole-trimethoprim]  Social History: aspect of the bilateral lower extremities. Dermatological changes noted to the bilateral lower extremities. Diffuse xerosis noted to the bilateral lower extremities. MUSCULOSKELETAL: Muscle strength is 4/5 for all pedal groups tested. No pain with palpation of the foot or ankle b/l. Ankle joint ROM is decreased in dorsiflexion with the knee extended. IMPRESSION/RECOMMENDATIONS:      1. Partial-thickness ulceration dorsum, left foot likely 2/2 fluid overload and CHF    2. Venous stasis dermatitis, B/L likely 2/2 fluid overload    -Patient seen and examined this p.m. at bedside  -VSS, no leukocytosis noted (Wbc 5.8), Hgb 13.0  -We will discuss with attending as to whether or not she would like x-rays  -No wound culture taken at this time as there is nothing to culture  -No acute signs of infection noted from podiatry standpoint at this time  -Left foot wound dressed with hydrogel, Adaptic, DSD, Ace bandages from just in front of the toes to just below the left knee  -Right lower extremity dressed with Adaptic, DSD, Ace bandages from just in front of the toes to just below the right knee  -Patient encouraged to elevate extremities to aid in decreasing lower extremity edema  -Lac-Hydrin ordered to room  -Prevalon boots ordered to room to aid in offloading of heels and prevent further deep tissue injury    DISPO: Stable partial-thickness ulceration dorsum, left foot and venous stasis dermatitis bilateral lower extremities; no acute signs of infection noted from podiatry standpoint; we will continue to provide local wound care while in-house    -The patient will be staffed with Dr. Ivonne Charles, MECHELLE.P.M. Erma Olguin DPM PGY-2  Pager: [phone]    Patient was seen and evaluated at bedside. Agree with residents assessment and treatment plan.   Addy Bess DPM 23-Apr-2025 16:30

## 2025-04-23 NOTE — ASU DISCHARGE PLAN (ADULT/PEDIATRIC) - ASU DC SPECIAL INSTRUCTIONSFT
TRANSURETHRAL RESECTION OF BLADDER TUMOR    GENERAL: It is common to have blood in your urine after your procedure.  It may be pink or even red; and it is important to increase fluid intake to 2-3L of water per day to keep the urine as clear as possible. Please inform your doctor if you have a significant amount of clot in the urine or if you are unable to void at all.  The urine may clear and then become bloody again especially as you are more physically active. It is not uncommon to have some burning when you urinate, this will gradually improve. If a catheter is in place, it is not uncommon to have occasional leakage or urine or blood around the catheter. Please call your urologist if this is excessive and/or the urine is not draining through the catheter into the bag.    UROLOGIC MEDICATIONS:  The following medications may have been sent to your pharmacy: Pyridium.    PAIN: You may take Tylenol (acetaminophen) 650-975mg and/or Motrin (ibuprofen) 400-600mg, both available over the counter, for pain every 6 hours as needed. Do not exceed 4000mg of Tylenol (acetaminophen) daily. You may alternate these medications such that you take one or the other every 3 hours for around the clock pain coverage.    STOOL SOFTENERS: Do not allow yourself to become constipated as straining may cause bleeding. Take stool softeners or a laxative (ex. Miralax, Colace, Senokot, ExLax, etc), available over the counter, if needed.    ANTICOAGULATION: If you are taking any blood thinning medications, please discuss with your urologist prior to restarting these medications unless otherwise specified.    BATHING: You may shower or bathe. If going home with brumfield, shower only until catheter is removed.    DIET: You may resume your regular diet and regular medication regimen.    ACTIVITY: No heavy lifting or strenuous exercise until you are evaluated at your post-operative appointment. Otherwise, you may return to your usual level of physical activity.    FOLLOW-UP: If you did not already schedule your post-operative appointment, please call your urologist to schedule and follow-up appointment.    CALL YOUR UROLOGIST IF: You have any bleeding that does not stop, inability to void >8 hours, fever over 100.4 F, chills, persistent nausea/vomiting, changes in your incision concerning for infection, or if your pain is not controlled on your discharge pain medications.

## 2025-04-24 PROBLEM — C67.9 MALIGNANT NEOPLASM OF BLADDER, UNSPECIFIED: Chronic | Status: ACTIVE | Noted: 2025-04-23

## 2025-04-25 NOTE — HISTORY OF PRESENT ILLNESS
[FreeTextEntry1] : Prostate MRI in 2018, PIRADS 3. biopsy 11/2018, negative.\par Prostate volume of 112 cc noted on MRI.\par PSA 7.62, free PSA 26% May 11 2021\par PSA range from 7-9 over the past years\par 4K score in Jan 2020 was 2%\par rare daytimeurinary bother\par on flaomx 0.8 REVIEW OF SYSTEMS:    CONSTITUTIONAL: No weakness, fevers or chills + syncope   EYES/ENT: No visual changes;  No dysphagia; No sore throat; No rhinorrhea; No sinus pain/pressure  NECK: No pain or stiffness  RESPIRATORY: No cough, wheezing, hemoptysis; No shortness of breath  CARDIOVASCULAR: No chest pain or palpitations; No lower extremity edema  GASTROINTESTINAL: No abdominal or epigastric pain. No nausea, vomiting, or hematemesis; No diarrhea or constipation. No melena or hematochezia.  GENITOURINARY: No dysuria, frequency or hematuria  NEUROLOGICAL: No numbness or weakness  MSK: ambulates without assistance   SKIN: No itching, burning, rashes, or lesions   All other review of systems is negative unless indicated above. REVIEW OF SYSTEMS:    CONSTITUTIONAL: No weakness, fevers or chills + syncope   EYES/ENT: No visual changes;  No dysphagia; No sore throat; No rhinorrhea; No sinus pain/pressure  NECK: No pain or stiffness  RESPIRATORY: No new cough, no wheezing, hemoptysis; No new shortness of breath  CARDIOVASCULAR: No chest pain or palpitations; No lower extremity edema  GASTROINTESTINAL: No abdominal or epigastric pain. No nausea, vomiting, or hematemesis; No diarrhea or constipation. No melena or hematochezia.  GENITOURINARY: No dysuria, frequency or hematuria  NEUROLOGICAL: No numbness or weakness  MSK: ambulates without assistance + right calf pain/cramping   SKIN: No itching, burning, rashes, or lesions   All other review of systems is negative unless indicated above.

## 2025-05-01 ENCOUNTER — APPOINTMENT (OUTPATIENT)
Dept: UROLOGY | Facility: CLINIC | Age: 75
End: 2025-05-01

## 2025-06-03 ENCOUNTER — APPOINTMENT (OUTPATIENT)
Dept: UROLOGY | Facility: CLINIC | Age: 75
End: 2025-06-03

## 2025-07-29 ENCOUNTER — APPOINTMENT (OUTPATIENT)
Dept: UROLOGY | Facility: CLINIC | Age: 75
End: 2025-07-29
Payer: MEDICARE

## 2025-07-29 VITALS
SYSTOLIC BLOOD PRESSURE: 133 MMHG | TEMPERATURE: 99.1 F | BODY MASS INDEX: 32.2 KG/M2 | HEART RATE: 76 BPM | HEIGHT: 71 IN | DIASTOLIC BLOOD PRESSURE: 77 MMHG | WEIGHT: 230 LBS

## 2025-07-29 DIAGNOSIS — C67.9 MALIGNANT NEOPLASM OF BLADDER, UNSPECIFIED: ICD-10-CM

## 2025-07-29 PROCEDURE — 52214Z: CUSTOM

## 2025-07-31 LAB
APPEARANCE: CLEAR
BACTERIA UR CULT: NORMAL
BACTERIA: NEGATIVE /HPF
BILIRUBIN URINE: NEGATIVE
BLOOD URINE: NEGATIVE
CAST: 1 /LPF
COLOR: YELLOW
EPITHELIAL CELLS: 0 /HPF
GLUCOSE QUALITATIVE U: NEGATIVE MG/DL
KETONES URINE: NEGATIVE MG/DL
LEUKOCYTE ESTERASE URINE: ABNORMAL
MICROSCOPIC-UA: NORMAL
NITRITE URINE: NEGATIVE
PH URINE: 6.5
PROTEIN URINE: NEGATIVE MG/DL
RED BLOOD CELLS URINE: 1 /HPF
SPECIFIC GRAVITY URINE: 1.02
UROBILINOGEN URINE: 1 MG/DL
WHITE BLOOD CELLS URINE: 0 /HPF

## 2025-08-05 LAB
CORE LAB BIOPSY: NORMAL
URINE CYTOLOGY: NORMAL

## 2025-08-14 ENCOUNTER — APPOINTMENT (OUTPATIENT)
Dept: ORTHOPEDIC SURGERY | Facility: CLINIC | Age: 75
End: 2025-08-14
Payer: MEDICARE

## 2025-08-14 DIAGNOSIS — M17.11 UNILATERAL PRIMARY OSTEOARTHRITIS, RIGHT KNEE: ICD-10-CM

## 2025-08-14 DIAGNOSIS — M61.00: ICD-10-CM

## 2025-08-14 DIAGNOSIS — S82.61XS DISPLACED FRACTURE OF LATERAL MALLEOLUS OF RIGHT FIBULA, SEQUELA: ICD-10-CM

## 2025-08-14 DIAGNOSIS — Z86.03 PERSONAL HISTORY OF NEOPLASM OF UNCERTAIN BEHAVIOR: ICD-10-CM

## 2025-08-14 DIAGNOSIS — Z87.898 PERSONAL HISTORY OF OTHER SPECIFIED CONDITIONS: ICD-10-CM

## 2025-08-14 DIAGNOSIS — N39.0 URINARY TRACT INFECTION, SITE NOT SPECIFIED: ICD-10-CM

## 2025-08-14 DIAGNOSIS — S80.01XA CONTUSION OF RIGHT KNEE, INITIAL ENCOUNTER: ICD-10-CM

## 2025-08-14 PROCEDURE — 73564 X-RAY EXAM KNEE 4 OR MORE: CPT | Mod: RT

## 2025-08-14 PROCEDURE — 73610 X-RAY EXAM OF ANKLE: CPT | Mod: RT

## 2025-08-14 PROCEDURE — 99203 OFFICE O/P NEW LOW 30 MIN: CPT

## 2025-08-14 RX ORDER — AMLODIPINE BESYLATE 5 MG/1
TABLET ORAL
Refills: 0 | Status: ACTIVE | COMMUNITY

## 2025-08-14 RX ORDER — TIRZEPATIDE 15 MG/.5ML
INJECTION, SOLUTION SUBCUTANEOUS
Refills: 0 | Status: ACTIVE | COMMUNITY

## 2025-08-14 RX ORDER — MECOBALAMIN 1000 MCG
TABLET,CHEWABLE ORAL
Refills: 0 | Status: ACTIVE | COMMUNITY

## (undated) DEVICE — PREP BETADINE KIT

## (undated) DEVICE — TUBING RANGER FLUID IRRIGATION SET DISP

## (undated) DEVICE — GLV 7.5 PROTEXIS (WHITE)

## (undated) DEVICE — NDL HYPO SAFE 18G X 1.5" (PINK)

## (undated) DEVICE — SOL IRR BAG NS 0.9% 3000ML

## (undated) DEVICE — FOLEY CATH 3-WAY 20FR 30CC LATEX HEMATURIA

## (undated) DEVICE — DRAINAGE BAG URINARY 4L

## (undated) DEVICE — WARMING BLANKET UPPER ADULT

## (undated) DEVICE — DRAPE TOWEL BLUE 17" X 24"

## (undated) DEVICE — PACK CYSTO

## (undated) DEVICE — FOLEY CATH 3-WAY 22FR 30CC LATEX HEMATURIA

## (undated) DEVICE — VENODYNE/SCD SLEEVE CALF MEDIUM

## (undated) DEVICE — DRSG TELFA 3 X 8

## (undated) DEVICE — TUBING SUCTION NONCONDUCTIVE 6MM X 12FT

## (undated) DEVICE — ELCTR PLASMA BUTTON OVAL 24FR 12-30 DEG

## (undated) DEVICE — UROVAC

## (undated) DEVICE — POSITIONER FOAM EGG CRATE ULNAR 2PCS (PINK)

## (undated) DEVICE — ELCTR PLASMA LOOP MEDIUM ANGLED 24FR 12-30 DEG